# Patient Record
Sex: FEMALE | Race: BLACK OR AFRICAN AMERICAN | NOT HISPANIC OR LATINO | Employment: FULL TIME | URBAN - METROPOLITAN AREA
[De-identification: names, ages, dates, MRNs, and addresses within clinical notes are randomized per-mention and may not be internally consistent; named-entity substitution may affect disease eponyms.]

---

## 2021-03-03 ENCOUNTER — NURSE TRIAGE (OUTPATIENT)
Dept: ADMINISTRATIVE | Facility: CLINIC | Age: 36
End: 2021-03-03

## 2021-09-23 ENCOUNTER — OFFICE VISIT (OUTPATIENT)
Dept: OBSTETRICS AND GYNECOLOGY | Facility: CLINIC | Age: 36
End: 2021-09-23
Payer: COMMERCIAL

## 2021-09-23 ENCOUNTER — LAB VISIT (OUTPATIENT)
Dept: LAB | Facility: OTHER | Age: 36
End: 2021-09-23
Attending: OBSTETRICS & GYNECOLOGY
Payer: COMMERCIAL

## 2021-09-23 VITALS
HEIGHT: 67 IN | WEIGHT: 148.38 LBS | BODY MASS INDEX: 23.29 KG/M2 | SYSTOLIC BLOOD PRESSURE: 108 MMHG | DIASTOLIC BLOOD PRESSURE: 60 MMHG

## 2021-09-23 DIAGNOSIS — N91.2 AMENORRHEA: ICD-10-CM

## 2021-09-23 DIAGNOSIS — N91.2 AMENORRHEA: Primary | ICD-10-CM

## 2021-09-23 DIAGNOSIS — Z32.00 POSSIBLE PREGNANCY: ICD-10-CM

## 2021-09-23 LAB
ABO + RH BLD: NORMAL
B-HCG UR QL: POSITIVE
BASOPHILS # BLD AUTO: 0.04 K/UL (ref 0–0.2)
BASOPHILS NFR BLD: 0.7 % (ref 0–1.9)
BLD GP AB SCN CELLS X3 SERPL QL: NORMAL
CTP QC/QA: YES
DIFFERENTIAL METHOD: ABNORMAL
EOSINOPHIL # BLD AUTO: 0.2 K/UL (ref 0–0.5)
EOSINOPHIL NFR BLD: 3.1 % (ref 0–8)
ERYTHROCYTE [DISTWIDTH] IN BLOOD BY AUTOMATED COUNT: 13.4 % (ref 11.5–14.5)
HCT VFR BLD AUTO: 36.4 % (ref 37–48.5)
HGB BLD-MCNC: 11.8 G/DL (ref 12–16)
IMM GRANULOCYTES # BLD AUTO: 0.01 K/UL (ref 0–0.04)
IMM GRANULOCYTES NFR BLD AUTO: 0.2 % (ref 0–0.5)
LYMPHOCYTES # BLD AUTO: 1.7 K/UL (ref 1–4.8)
LYMPHOCYTES NFR BLD: 29.3 % (ref 18–48)
MCH RBC QN AUTO: 26.6 PG (ref 27–31)
MCHC RBC AUTO-ENTMCNC: 32.4 G/DL (ref 32–36)
MCV RBC AUTO: 82 FL (ref 82–98)
MONOCYTES # BLD AUTO: 0.5 K/UL (ref 0.3–1)
MONOCYTES NFR BLD: 9.4 % (ref 4–15)
NEUTROPHILS # BLD AUTO: 3.3 K/UL (ref 1.8–7.7)
NEUTROPHILS NFR BLD: 57.3 % (ref 38–73)
NRBC BLD-RTO: 0 /100 WBC
PLATELET # BLD AUTO: 324 K/UL (ref 150–450)
PMV BLD AUTO: 9.6 FL (ref 9.2–12.9)
RBC # BLD AUTO: 4.44 M/UL (ref 4–5.4)
WBC # BLD AUTO: 5.76 K/UL (ref 3.9–12.7)

## 2021-09-23 PROCEDURE — 87086 URINE CULTURE/COLONY COUNT: CPT | Performed by: OBSTETRICS & GYNECOLOGY

## 2021-09-23 PROCEDURE — 86762 RUBELLA ANTIBODY: CPT | Performed by: OBSTETRICS & GYNECOLOGY

## 2021-09-23 PROCEDURE — 86900 BLOOD TYPING SEROLOGIC ABO: CPT | Performed by: OBSTETRICS & GYNECOLOGY

## 2021-09-23 PROCEDURE — 87389 HIV-1 AG W/HIV-1&-2 AB AG IA: CPT | Performed by: OBSTETRICS & GYNECOLOGY

## 2021-09-23 PROCEDURE — 87340 HEPATITIS B SURFACE AG IA: CPT | Performed by: OBSTETRICS & GYNECOLOGY

## 2021-09-23 PROCEDURE — 36415 COLL VENOUS BLD VENIPUNCTURE: CPT | Performed by: OBSTETRICS & GYNECOLOGY

## 2021-09-23 PROCEDURE — 86592 SYPHILIS TEST NON-TREP QUAL: CPT | Performed by: OBSTETRICS & GYNECOLOGY

## 2021-09-23 PROCEDURE — 86803 HEPATITIS C AB TEST: CPT | Performed by: OBSTETRICS & GYNECOLOGY

## 2021-09-23 PROCEDURE — 87591 N.GONORRHOEAE DNA AMP PROB: CPT | Performed by: OBSTETRICS & GYNECOLOGY

## 2021-09-23 PROCEDURE — 87491 CHLMYD TRACH DNA AMP PROBE: CPT | Mod: 59 | Performed by: OBSTETRICS & GYNECOLOGY

## 2021-09-23 PROCEDURE — 81025 URINE PREGNANCY TEST: CPT | Mod: PBBFAC,PN | Performed by: OBSTETRICS & GYNECOLOGY

## 2021-09-23 PROCEDURE — 99999 PR PBB SHADOW E&M-NEW PATIENT-LVL III: CPT | Mod: PBBFAC,,, | Performed by: OBSTETRICS & GYNECOLOGY

## 2021-09-23 PROCEDURE — 86787 VARICELLA-ZOSTER ANTIBODY: CPT | Performed by: OBSTETRICS & GYNECOLOGY

## 2021-09-23 PROCEDURE — 99204 OFFICE O/P NEW MOD 45 MIN: CPT | Mod: S$GLB,,, | Performed by: OBSTETRICS & GYNECOLOGY

## 2021-09-23 PROCEDURE — 85025 COMPLETE CBC W/AUTO DIFF WBC: CPT | Performed by: OBSTETRICS & GYNECOLOGY

## 2021-09-23 PROCEDURE — 99203 OFFICE O/P NEW LOW 30 MIN: CPT | Mod: PBBFAC,PN | Performed by: OBSTETRICS & GYNECOLOGY

## 2021-09-23 PROCEDURE — 83020 HEMOGLOBIN ELECTROPHORESIS: CPT | Performed by: OBSTETRICS & GYNECOLOGY

## 2021-09-23 PROCEDURE — 88142 CYTOPATH C/V THIN LAYER: CPT | Performed by: OBSTETRICS & GYNECOLOGY

## 2021-09-23 PROCEDURE — 87624 HPV HI-RISK TYP POOLED RSLT: CPT | Performed by: OBSTETRICS & GYNECOLOGY

## 2021-09-23 PROCEDURE — 99204 PR OFFICE/OUTPT VISIT, NEW, LEVL IV, 45-59 MIN: ICD-10-PCS | Mod: S$GLB,,, | Performed by: OBSTETRICS & GYNECOLOGY

## 2021-09-23 PROCEDURE — 87481 CANDIDA DNA AMP PROBE: CPT | Mod: 59 | Performed by: OBSTETRICS & GYNECOLOGY

## 2021-09-23 PROCEDURE — 99999 PR PBB SHADOW E&M-NEW PATIENT-LVL III: ICD-10-PCS | Mod: PBBFAC,,, | Performed by: OBSTETRICS & GYNECOLOGY

## 2021-09-23 RX ORDER — SWAB
1 SWAB, NON-MEDICATED MISCELLANEOUS DAILY
Qty: 30 TABLET | Refills: 9 | Status: SHIPPED | OUTPATIENT
Start: 2021-09-23 | End: 2021-10-24 | Stop reason: SDUPTHER

## 2021-09-24 LAB
C TRACH DNA SPEC QL NAA+PROBE: NOT DETECTED
HBV SURFACE AG SERPL QL IA: NEGATIVE
HCV AB SERPL QL IA: NEGATIVE
HIV 1+2 AB+HIV1 P24 AG SERPL QL IA: NEGATIVE
N GONORRHOEA DNA SPEC QL NAA+PROBE: NOT DETECTED
RPR SER QL: NORMAL
RUBV IGG SER-ACNC: 63.3 IU/ML
RUBV IGG SER-IMP: REACTIVE

## 2021-09-25 LAB
BACTERIA UR CULT: NO GROWTH
VARICELLA INTERPRETATION: POSITIVE
VARICELLA ZOSTER IGG: 2.45 ISR (ref 0–0.9)

## 2021-09-27 LAB
BACTERIAL VAGINOSIS DNA: POSITIVE
CANDIDA GLABRATA DNA: NEGATIVE
CANDIDA KRUSEI DNA: NEGATIVE
CANDIDA RRNA VAG QL PROBE: POSITIVE
T VAGINALIS RRNA GENITAL QL PROBE: POSITIVE

## 2021-09-28 ENCOUNTER — PATIENT MESSAGE (OUTPATIENT)
Dept: OBSTETRICS AND GYNECOLOGY | Facility: CLINIC | Age: 36
End: 2021-09-28

## 2021-09-28 DIAGNOSIS — A59.9 TRICHOMONAS INFECTION: ICD-10-CM

## 2021-09-28 DIAGNOSIS — B37.31 VAGINAL CANDIDIASIS: ICD-10-CM

## 2021-09-28 DIAGNOSIS — B96.89 BV (BACTERIAL VAGINOSIS): Primary | ICD-10-CM

## 2021-09-28 DIAGNOSIS — N76.0 BV (BACTERIAL VAGINOSIS): Primary | ICD-10-CM

## 2021-09-28 RX ORDER — METRONIDAZOLE 500 MG/1
500 TABLET ORAL EVERY 12 HOURS
Qty: 14 TABLET | Refills: 0 | Status: SHIPPED | OUTPATIENT
Start: 2021-09-28 | End: 2021-10-05

## 2021-09-28 RX ORDER — TERCONAZOLE 4 MG/G
1 CREAM VAGINAL NIGHTLY
Qty: 1 TUBE | Refills: 0 | Status: SHIPPED | OUTPATIENT
Start: 2021-09-28 | End: 2021-10-05

## 2021-09-29 LAB
FINAL PATHOLOGIC DIAGNOSIS: NORMAL
Lab: NORMAL

## 2021-09-30 LAB
HPV HR 12 DNA SPEC QL NAA+PROBE: POSITIVE
HPV16 AG SPEC QL: NEGATIVE
HPV18 DNA SPEC QL NAA+PROBE: NEGATIVE

## 2021-10-01 PROBLEM — B97.7 HPV (HUMAN PAPILLOMA VIRUS) INFECTION: Status: ACTIVE | Noted: 2021-10-01

## 2021-10-03 ENCOUNTER — PATIENT MESSAGE (OUTPATIENT)
Dept: OBSTETRICS AND GYNECOLOGY | Facility: CLINIC | Age: 36
End: 2021-10-03

## 2021-10-04 LAB
HGB A2 MFR BLD HPLC: 2.2 % (ref 2.2–3.2)
HGB FRACT BLD ELPH-IMP: NORMAL
HGB FRACT BLD ELPH-IMP: NORMAL

## 2021-10-21 ENCOUNTER — INITIAL PRENATAL (OUTPATIENT)
Dept: OBSTETRICS AND GYNECOLOGY | Facility: CLINIC | Age: 36
End: 2021-10-21
Payer: COMMERCIAL

## 2021-10-21 ENCOUNTER — PROCEDURE VISIT (OUTPATIENT)
Dept: OBSTETRICS AND GYNECOLOGY | Facility: CLINIC | Age: 36
End: 2021-10-21
Payer: COMMERCIAL

## 2021-10-21 VITALS
WEIGHT: 143.06 LBS | SYSTOLIC BLOOD PRESSURE: 102 MMHG | DIASTOLIC BLOOD PRESSURE: 64 MMHG | BODY MASS INDEX: 22.19 KG/M2

## 2021-10-21 DIAGNOSIS — N91.2 AMENORRHEA: ICD-10-CM

## 2021-10-21 DIAGNOSIS — O09.519 ADVANCED MATERNAL AGE, PRIMIGRAVIDA, ANTEPARTUM: Primary | ICD-10-CM

## 2021-10-21 PROCEDURE — 0502F SUBSEQUENT PRENATAL CARE: CPT | Mod: CPTII,S$GLB,, | Performed by: OBSTETRICS & GYNECOLOGY

## 2021-10-21 PROCEDURE — 0502F PR SUBSEQUENT PRENATAL CARE: ICD-10-PCS | Mod: CPTII,S$GLB,, | Performed by: OBSTETRICS & GYNECOLOGY

## 2021-10-21 PROCEDURE — 99212 OFFICE O/P EST SF 10 MIN: CPT | Mod: PBBFAC,25,PN | Performed by: OBSTETRICS & GYNECOLOGY

## 2021-10-21 PROCEDURE — 76801 OB US < 14 WKS SINGLE FETUS: CPT | Mod: PBBFAC | Performed by: OBSTETRICS & GYNECOLOGY

## 2021-10-21 PROCEDURE — 99999 PR PBB SHADOW E&M-EST. PATIENT-LVL II: CPT | Mod: PBBFAC,,, | Performed by: OBSTETRICS & GYNECOLOGY

## 2021-10-21 PROCEDURE — 99999 PR PBB SHADOW E&M-EST. PATIENT-LVL II: ICD-10-PCS | Mod: PBBFAC,,, | Performed by: OBSTETRICS & GYNECOLOGY

## 2021-10-21 RX ORDER — PNV NO.95/FERROUS FUM/FOLIC AC 28MG-0.8MG
1 TABLET ORAL DAILY
COMMUNITY
Start: 2021-09-23 | End: 2022-01-21

## 2021-10-29 ENCOUNTER — PATIENT MESSAGE (OUTPATIENT)
Dept: ADMINISTRATIVE | Facility: OTHER | Age: 36
End: 2021-10-29
Payer: COMMERCIAL

## 2021-11-05 ENCOUNTER — PATIENT MESSAGE (OUTPATIENT)
Dept: ADMINISTRATIVE | Facility: OTHER | Age: 36
End: 2021-11-05
Payer: COMMERCIAL

## 2021-11-17 ENCOUNTER — ROUTINE PRENATAL (OUTPATIENT)
Dept: OBSTETRICS AND GYNECOLOGY | Facility: CLINIC | Age: 36
End: 2021-11-17
Payer: COMMERCIAL

## 2021-11-17 VITALS — SYSTOLIC BLOOD PRESSURE: 114 MMHG | BODY MASS INDEX: 22.02 KG/M2 | DIASTOLIC BLOOD PRESSURE: 64 MMHG | WEIGHT: 142 LBS

## 2021-11-17 DIAGNOSIS — Z34.02 ENCOUNTER FOR SUPERVISION OF NORMAL FIRST PREGNANCY IN SECOND TRIMESTER: Primary | ICD-10-CM

## 2021-11-17 PROCEDURE — 99999 PR PBB SHADOW E&M-EST. PATIENT-LVL II: CPT | Mod: PBBFAC,,, | Performed by: ADVANCED PRACTICE MIDWIFE

## 2021-11-17 PROCEDURE — 0502F PR SUBSEQUENT PRENATAL CARE: ICD-10-PCS | Mod: CPTII,S$GLB,, | Performed by: ADVANCED PRACTICE MIDWIFE

## 2021-11-17 PROCEDURE — 99999 PR PBB SHADOW E&M-EST. PATIENT-LVL II: ICD-10-PCS | Mod: PBBFAC,,, | Performed by: ADVANCED PRACTICE MIDWIFE

## 2021-11-17 PROCEDURE — 0502F SUBSEQUENT PRENATAL CARE: CPT | Mod: CPTII,S$GLB,, | Performed by: ADVANCED PRACTICE MIDWIFE

## 2021-12-15 ENCOUNTER — ROUTINE PRENATAL (OUTPATIENT)
Dept: OBSTETRICS AND GYNECOLOGY | Facility: CLINIC | Age: 36
End: 2021-12-15
Payer: COMMERCIAL

## 2021-12-15 ENCOUNTER — PATIENT MESSAGE (OUTPATIENT)
Dept: MATERNAL FETAL MEDICINE | Facility: CLINIC | Age: 36
End: 2021-12-15
Payer: COMMERCIAL

## 2021-12-15 VITALS
DIASTOLIC BLOOD PRESSURE: 68 MMHG | WEIGHT: 142.44 LBS | SYSTOLIC BLOOD PRESSURE: 106 MMHG | BODY MASS INDEX: 22.09 KG/M2

## 2021-12-15 DIAGNOSIS — N91.2 AMENORRHEA: Primary | ICD-10-CM

## 2021-12-15 DIAGNOSIS — Z34.02 ENCOUNTER FOR SUPERVISION OF NORMAL FIRST PREGNANCY IN SECOND TRIMESTER: ICD-10-CM

## 2021-12-15 PROCEDURE — 0502F PR SUBSEQUENT PRENATAL CARE: ICD-10-PCS | Mod: CPTII,S$GLB,, | Performed by: ADVANCED PRACTICE MIDWIFE

## 2021-12-15 PROCEDURE — 99999 PR PBB SHADOW E&M-EST. PATIENT-LVL II: ICD-10-PCS | Mod: PBBFAC,,, | Performed by: ADVANCED PRACTICE MIDWIFE

## 2021-12-15 PROCEDURE — 0502F SUBSEQUENT PRENATAL CARE: CPT | Mod: CPTII,S$GLB,, | Performed by: ADVANCED PRACTICE MIDWIFE

## 2021-12-15 PROCEDURE — 99999 PR PBB SHADOW E&M-EST. PATIENT-LVL II: CPT | Mod: PBBFAC,,, | Performed by: ADVANCED PRACTICE MIDWIFE

## 2021-12-17 ENCOUNTER — PROCEDURE VISIT (OUTPATIENT)
Dept: MATERNAL FETAL MEDICINE | Facility: CLINIC | Age: 36
End: 2021-12-17
Payer: COMMERCIAL

## 2021-12-17 DIAGNOSIS — Z34.02 ENCOUNTER FOR SUPERVISION OF NORMAL FIRST PREGNANCY IN SECOND TRIMESTER: ICD-10-CM

## 2021-12-17 PROCEDURE — 76811 OB US DETAILED SNGL FETUS: CPT | Mod: S$GLB,,, | Performed by: OBSTETRICS & GYNECOLOGY

## 2021-12-17 PROCEDURE — 76811 US MFM PROCEDURE (VIEWPOINT): ICD-10-PCS | Mod: S$GLB,,, | Performed by: OBSTETRICS & GYNECOLOGY

## 2022-01-12 DIAGNOSIS — Z36.2 ENCOUNTER FOR FOLLOW-UP ULTRASOUND OF FETAL ANATOMY: Primary | ICD-10-CM

## 2022-01-21 ENCOUNTER — ROUTINE PRENATAL (OUTPATIENT)
Dept: OBSTETRICS AND GYNECOLOGY | Facility: CLINIC | Age: 37
End: 2022-01-21
Payer: COMMERCIAL

## 2022-01-21 VITALS
WEIGHT: 147.06 LBS | SYSTOLIC BLOOD PRESSURE: 100 MMHG | BODY MASS INDEX: 22.81 KG/M2 | DIASTOLIC BLOOD PRESSURE: 62 MMHG

## 2022-01-21 DIAGNOSIS — Z3A.24 24 WEEKS GESTATION OF PREGNANCY: Primary | ICD-10-CM

## 2022-01-21 DIAGNOSIS — Z34.02 ENCOUNTER FOR SUPERVISION OF NORMAL FIRST PREGNANCY IN SECOND TRIMESTER: ICD-10-CM

## 2022-01-21 PROCEDURE — 99999 PR PBB SHADOW E&M-EST. PATIENT-LVL III: ICD-10-PCS | Mod: PBBFAC,,, | Performed by: ADVANCED PRACTICE MIDWIFE

## 2022-01-21 PROCEDURE — 0502F PR SUBSEQUENT PRENATAL CARE: ICD-10-PCS | Mod: CPTII,S$GLB,, | Performed by: ADVANCED PRACTICE MIDWIFE

## 2022-01-21 PROCEDURE — 99999 PR PBB SHADOW E&M-EST. PATIENT-LVL III: CPT | Mod: PBBFAC,,, | Performed by: ADVANCED PRACTICE MIDWIFE

## 2022-01-21 PROCEDURE — 0502F SUBSEQUENT PRENATAL CARE: CPT | Mod: CPTII,S$GLB,, | Performed by: ADVANCED PRACTICE MIDWIFE

## 2022-01-25 ENCOUNTER — PROCEDURE VISIT (OUTPATIENT)
Dept: OBSTETRICS AND GYNECOLOGY | Facility: CLINIC | Age: 37
End: 2022-01-25
Payer: COMMERCIAL

## 2022-01-25 ENCOUNTER — PATIENT MESSAGE (OUTPATIENT)
Dept: MATERNAL FETAL MEDICINE | Facility: CLINIC | Age: 37
End: 2022-01-25
Payer: COMMERCIAL

## 2022-01-25 DIAGNOSIS — Z3A.24 24 WEEKS GESTATION OF PREGNANCY: ICD-10-CM

## 2022-01-25 LAB
BASOPHILS # BLD AUTO: 0.05 K/UL (ref 0–0.2)
BASOPHILS NFR BLD: 0.6 % (ref 0–1.9)
DIFFERENTIAL METHOD: ABNORMAL
EOSINOPHIL # BLD AUTO: 0.1 K/UL (ref 0–0.5)
EOSINOPHIL NFR BLD: 1 % (ref 0–8)
ERYTHROCYTE [DISTWIDTH] IN BLOOD BY AUTOMATED COUNT: 14.3 % (ref 11.5–14.5)
GLUCOSE SERPL-MCNC: 98 MG/DL (ref 70–140)
HCT VFR BLD AUTO: 31.3 % (ref 37–48.5)
HGB BLD-MCNC: 10.1 G/DL (ref 12–16)
IMM GRANULOCYTES # BLD AUTO: 0.06 K/UL (ref 0–0.04)
IMM GRANULOCYTES NFR BLD AUTO: 0.7 % (ref 0–0.5)
LYMPHOCYTES # BLD AUTO: 1.3 K/UL (ref 1–4.8)
LYMPHOCYTES NFR BLD: 15.9 % (ref 18–48)
MCH RBC QN AUTO: 28.9 PG (ref 27–31)
MCHC RBC AUTO-ENTMCNC: 32.3 G/DL (ref 32–36)
MCV RBC AUTO: 89 FL (ref 82–98)
MONOCYTES # BLD AUTO: 0.6 K/UL (ref 0.3–1)
MONOCYTES NFR BLD: 7.9 % (ref 4–15)
NEUTROPHILS # BLD AUTO: 6 K/UL (ref 1.8–7.7)
NEUTROPHILS NFR BLD: 73.9 % (ref 38–73)
NRBC BLD-RTO: 0 /100 WBC
PLATELET # BLD AUTO: 277 K/UL (ref 150–450)
PMV BLD AUTO: 11 FL (ref 9.2–12.9)
RBC # BLD AUTO: 3.5 M/UL (ref 4–5.4)
WBC # BLD AUTO: 8.1 K/UL (ref 3.9–12.7)

## 2022-01-25 PROCEDURE — 82950 GLUCOSE TEST: CPT | Performed by: ADVANCED PRACTICE MIDWIFE

## 2022-01-25 PROCEDURE — 85025 COMPLETE CBC W/AUTO DIFF WBC: CPT | Performed by: ADVANCED PRACTICE MIDWIFE

## 2022-01-26 ENCOUNTER — PROCEDURE VISIT (OUTPATIENT)
Dept: MATERNAL FETAL MEDICINE | Facility: CLINIC | Age: 37
End: 2022-01-26
Payer: COMMERCIAL

## 2022-01-26 DIAGNOSIS — Z36.2 ENCOUNTER FOR FOLLOW-UP ULTRASOUND OF FETAL ANATOMY: ICD-10-CM

## 2022-01-26 DIAGNOSIS — O09.523 AMA (ADVANCED MATERNAL AGE) MULTIGRAVIDA 35+, THIRD TRIMESTER: ICD-10-CM

## 2022-01-26 DIAGNOSIS — Z36.89 ENCOUNTER FOR ULTRASOUND TO ASSESS FETAL GROWTH: Primary | ICD-10-CM

## 2022-01-26 PROCEDURE — 76816 US MFM PROCEDURE (VIEWPOINT): ICD-10-PCS | Mod: S$GLB,,, | Performed by: OBSTETRICS & GYNECOLOGY

## 2022-01-26 PROCEDURE — 76816 OB US FOLLOW-UP PER FETUS: CPT | Mod: S$GLB,,, | Performed by: OBSTETRICS & GYNECOLOGY

## 2022-01-31 NOTE — PROGRESS NOTES
Reason for visit: Routine Prenatal Visit      HPI:   36 y.o., at 25w5d by Estimated Date of Delivery: 22    In with no c/o    - Contractions: denies  - Bleeding: denies  - Loss of fluid: denies  - Fetal movement: reports FM  - Nausea: no  - Vomiting: no  - Headache: no      Reviewed:    Past medical, surgical, social, family, and obstetric history: Reviewed and updated in EMR.  Medications: Reviewed and updated in EMR.  Allergies: Patient has no known allergies.    Pregnancy dating, labs, ultrasound reports, prenatal testing, and problem list: Reviewed and updated in EMR.  Outside records: na  Independent interpretation of tests: na  Discussion with another healthcare professional: na      Vitals: /62   Wt 66.7 kg (147 lb 0.8 oz)   LMP 2021   BMI 22.81 kg/m²     Physical exam:  GENERAL: No acute distress  ABD: Gravid      Assessment and Plan:    24 weeks gestation of pregnancy  -     CBC Auto Differential; Future; Expected date: 2022  -     OB Glucose Screen; Future; Expected date: 2022    Encounter for supervision of normal first pregnancy in second trimester          Instructions given for DM screen next visit   labor precautions given  Follow-up: 4 weeks      I spent a total of 20 minutes on the day of the visit. This includes face to face time and non-face to face time preparing to see the patient (eg, review of tests), Obtaining and/or reviewing separately obtained history, Documenting clinical information in the electronic or other health record, Independently interpreting results and communicating results to the patient/family/caregiver, or Care coordination.

## 2022-02-09 ENCOUNTER — ROUTINE PRENATAL (OUTPATIENT)
Dept: OBSTETRICS AND GYNECOLOGY | Facility: CLINIC | Age: 37
End: 2022-02-09
Payer: COMMERCIAL

## 2022-02-09 VITALS
BODY MASS INDEX: 23.25 KG/M2 | SYSTOLIC BLOOD PRESSURE: 112 MMHG | DIASTOLIC BLOOD PRESSURE: 62 MMHG | WEIGHT: 149.94 LBS

## 2022-02-09 DIAGNOSIS — O09.529 ANTEPARTUM MULTIGRAVIDA OF ADVANCED MATERNAL AGE: Primary | ICD-10-CM

## 2022-02-09 PROCEDURE — 0502F PR SUBSEQUENT PRENATAL CARE: ICD-10-PCS | Mod: CPTII,S$GLB,, | Performed by: OBSTETRICS & GYNECOLOGY

## 2022-02-09 PROCEDURE — 0502F SUBSEQUENT PRENATAL CARE: CPT | Mod: CPTII,S$GLB,, | Performed by: OBSTETRICS & GYNECOLOGY

## 2022-02-09 PROCEDURE — 99999 PR PBB SHADOW E&M-EST. PATIENT-LVL II: CPT | Mod: PBBFAC,,, | Performed by: OBSTETRICS & GYNECOLOGY

## 2022-02-09 PROCEDURE — 99999 PR PBB SHADOW E&M-EST. PATIENT-LVL II: ICD-10-PCS | Mod: PBBFAC,,, | Performed by: OBSTETRICS & GYNECOLOGY

## 2022-02-09 NOTE — PROGRESS NOTES
Reason for visit: Routine Prenatal Visit      HPI:   36 y.o., at 27w0d by Estimated Date of Delivery: 22    No complaints    - Contractions: N  - Bleeding: N  - Loss of fluid: N  - Fetal movement: Y  - Nausea: N  - Vomiting: N  - Headache: N      Reviewed:    Past medical, surgical, social, family, and obstetric history: Reviewed and updated in EMR.  Medications: Reviewed and updated in EMR.  Allergies: Patient has no known allergies.    Pregnancy dating, labs, ultrasound reports, prenatal testing, and problem list: Reviewed and updated in EMR.  Outside records: NA  Independent interpretation of tests: NA  Discussion with another healthcare professional: NA      Vitals: /62   Wt 68 kg (149 lb 14.6 oz)   LMP 2021   BMI 23.25 kg/m²     Physical exam:  GENERAL: No acute distress  ABD: Gravid  FH: 26  FHT: 150      Assessment and Plan:    Antepartum multigravida of advanced maternal age         Discussed TDAP and Influenza vax- pt declined at this time. Counseled regarding vaccine recommendations during pregnancy. Reports understanding    Pt plans to travel to [place] for 1 week at the end of February and requests letter of approval to fly. Letter provided. Counseled regarding importance of calf raises and walking/ 2 hours to decrease risk of thrombosis.      labor precautions given  Follow-up: 2 weeks    Marianela Montiel  MS3    Seen and examined.  Agree with note.  All questions answered  KARLENE Adan MD

## 2022-02-09 NOTE — LETTER
February 9, 2022      [facility]  [address], [address]  [facility] [address]  Phone: [phone]  Fax: [phone]       Patient: Marianela Contreras   YOB: 1985  Date of Visit: 02/09/2022    To Whom It May Concern:    Deuce Contreras  was at [facility] on 02/09/2022. She has a due date of May 11, 2022. The patient may fly with no restrictions until April 13, 2022. If you have any questions or concerns, or if I can be of further assistance, please do not hesitate to contact me.    Sincerely,    Taty Adan MD, FACOG  OBGYN  [facility] [address]  [place] [address] [phone] (office)  [phone] (fax)

## 2022-02-23 ENCOUNTER — ROUTINE PRENATAL (OUTPATIENT)
Dept: OBSTETRICS AND GYNECOLOGY | Facility: CLINIC | Age: 37
End: 2022-02-23
Payer: COMMERCIAL

## 2022-02-23 VITALS
SYSTOLIC BLOOD PRESSURE: 120 MMHG | DIASTOLIC BLOOD PRESSURE: 72 MMHG | BODY MASS INDEX: 23.25 KG/M2 | WEIGHT: 149.94 LBS

## 2022-02-23 DIAGNOSIS — Z34.83 ENCOUNTER FOR SUPERVISION OF OTHER NORMAL PREGNANCY IN THIRD TRIMESTER: Primary | ICD-10-CM

## 2022-02-23 PROCEDURE — 99999 PR PBB SHADOW E&M-EST. PATIENT-LVL II: CPT | Mod: PBBFAC,,, | Performed by: ADVANCED PRACTICE MIDWIFE

## 2022-02-23 PROCEDURE — 0502F SUBSEQUENT PRENATAL CARE: CPT | Mod: S$GLB,,, | Performed by: ADVANCED PRACTICE MIDWIFE

## 2022-02-23 PROCEDURE — 99999 PR PBB SHADOW E&M-EST. PATIENT-LVL II: ICD-10-PCS | Mod: PBBFAC,,, | Performed by: ADVANCED PRACTICE MIDWIFE

## 2022-02-23 PROCEDURE — 0502F PR SUBSEQUENT PRENATAL CARE: ICD-10-PCS | Mod: S$GLB,,, | Performed by: ADVANCED PRACTICE MIDWIFE

## 2022-02-23 RX ORDER — PNV NO.95/FERROUS FUM/FOLIC AC 28MG-0.8MG
1 TABLET ORAL DAILY
COMMUNITY
Start: 2022-02-15 | End: 2022-02-23

## 2022-02-23 NOTE — PROGRESS NOTES
Reason for visit: Routine Prenatal Visit      HPI:   36 y.o., at 29w0d by Estimated Date of Delivery: 22    Patient is well with no complaints.    - Contractions: denies  - Bleeding: denies  - Loss of fluid: denies  - Fetal movement: yes  - Nausea: denies  - Vomiting: denies  - Headache: denies    -Plans to breastfeed.  -Pt requests waterbath birth.     Reviewed:    Past medical, surgical, social, family, and obstetric history: Reviewed and updated in EMR.  Medications: Reviewed and updated in EMR.  Allergies: Patient has no known allergies.    Pregnancy dating, labs, ultrasound reports, prenatal testing, and problem list: Reviewed and updated in EMR.  Outside records: na  Independent interpretation of tests: na  Discussion with another healthcare professional: na      Vitals: /72   Wt 68 kg (149 lb 14.6 oz)   LMP 2021   BMI 23.25 kg/m²     Physical exam:  GENERAL: No acute distress  ABD: Gravid  FH: 28 cm  FHT: 140 bpm    Assessment and Plan:       Pt requests a waterbath birth. Discussed mode of delivery at length with pt and will refer pt to alternative birth center. Informed pt needs to be seen before 31 weeks for waterbath delivery by midwives.   Discussed TDAP and Influenza vax - pt declined.       labor precautions given  Follow-up: 2 weeks with [facility]    Randa Rene MS3    I spent a total of 20 minutes on the day of the visit. This includes face to face time and non-face to face time preparing to see the patient (eg, review of tests), Obtaining and/or reviewing separately obtained history, Documenting clinical information in the electronic or other health record, Independently interpreting results and communicating results to the patient/family/caregiver, or Care coordination.

## 2022-03-09 ENCOUNTER — ROUTINE PRENATAL (OUTPATIENT)
Dept: OBSTETRICS AND GYNECOLOGY | Facility: CLINIC | Age: 37
End: 2022-03-09
Payer: COMMERCIAL

## 2022-03-09 VITALS
SYSTOLIC BLOOD PRESSURE: 110 MMHG | WEIGHT: 157.19 LBS | BODY MASS INDEX: 24.38 KG/M2 | DIASTOLIC BLOOD PRESSURE: 74 MMHG

## 2022-03-09 DIAGNOSIS — O09.529 ANTEPARTUM MULTIGRAVIDA OF ADVANCED MATERNAL AGE: Primary | ICD-10-CM

## 2022-03-09 PROCEDURE — 99999 PR PBB SHADOW E&M-EST. PATIENT-LVL II: CPT | Mod: PBBFAC,,, | Performed by: OBSTETRICS & GYNECOLOGY

## 2022-03-09 PROCEDURE — 0502F SUBSEQUENT PRENATAL CARE: CPT | Mod: CPTII,S$GLB,, | Performed by: OBSTETRICS & GYNECOLOGY

## 2022-03-09 PROCEDURE — 99999 PR PBB SHADOW E&M-EST. PATIENT-LVL II: ICD-10-PCS | Mod: PBBFAC,,, | Performed by: OBSTETRICS & GYNECOLOGY

## 2022-03-09 PROCEDURE — 0502F PR SUBSEQUENT PRENATAL CARE: ICD-10-PCS | Mod: CPTII,S$GLB,, | Performed by: OBSTETRICS & GYNECOLOGY

## 2022-03-09 NOTE — PROGRESS NOTES
Reason for visit: Routine Prenatal Visit      HPI:   36 y.o., at 31w0d by Estimated Date of Delivery: 22    No complaints today.     - Contractions:  No  - Bleeding:  No  - Loss of fluid:  No  - Fetal movement: Yes  - Nausea:  No  - Vomiting:  No  - Headache:  No      Reviewed:    Past medical, surgical, social, family, and obstetric history: Reviewed and updated in EMR.  Medications: Reviewed and updated in EMR.  Allergies: Patient has no known allergies.    Pregnancy dating, labs, ultrasound reports, prenatal testing, and problem list: Reviewed and updated in EMR.  Outside records: Available records reviewed      Vitals: /74   Wt 71.3 kg (157 lb 3 oz)   LMP 2021   BMI 24.38 kg/m²     Physical exam:  GENERAL: No acute distress  ABD: Gravid      Assessment and Plan:    Antepartum multigravida of advanced maternal age         Desires tub birth. Consultation with ABC scheduled for early next week prior to 32 wga.      labor precautions given  Follow-up: 2 weeks     Lauren Powell MD   PGY-2, OB-GYN      Seen and examined.  Agree with note.  All questions answered  KARLENE Adan MD

## 2022-03-15 ENCOUNTER — PATIENT MESSAGE (OUTPATIENT)
Dept: MATERNAL FETAL MEDICINE | Facility: CLINIC | Age: 37
End: 2022-03-15
Payer: COMMERCIAL

## 2022-03-16 ENCOUNTER — PATIENT MESSAGE (OUTPATIENT)
Dept: ADMINISTRATIVE | Facility: OTHER | Age: 37
End: 2022-03-16
Payer: COMMERCIAL

## 2022-03-16 ENCOUNTER — PROCEDURE VISIT (OUTPATIENT)
Dept: MATERNAL FETAL MEDICINE | Facility: CLINIC | Age: 37
End: 2022-03-16
Payer: COMMERCIAL

## 2022-03-16 ENCOUNTER — INITIAL PRENATAL (OUTPATIENT)
Dept: OBSTETRICS AND GYNECOLOGY | Facility: CLINIC | Age: 37
End: 2022-03-16
Payer: COMMERCIAL

## 2022-03-16 VITALS — SYSTOLIC BLOOD PRESSURE: 120 MMHG | WEIGHT: 155 LBS | BODY MASS INDEX: 24.04 KG/M2 | DIASTOLIC BLOOD PRESSURE: 70 MMHG

## 2022-03-16 DIAGNOSIS — Z34.90 PREGNANCY WITH ONE FETUS, ANTEPARTUM: Primary | ICD-10-CM

## 2022-03-16 DIAGNOSIS — O09.523 AMA (ADVANCED MATERNAL AGE) MULTIGRAVIDA 35+, THIRD TRIMESTER: ICD-10-CM

## 2022-03-16 DIAGNOSIS — Z36.89 ENCOUNTER FOR ULTRASOUND TO ASSESS FETAL GROWTH: ICD-10-CM

## 2022-03-16 DIAGNOSIS — O09.513 PRIMIGRAVIDA OF ADVANCED MATERNAL AGE IN THIRD TRIMESTER: ICD-10-CM

## 2022-03-16 DIAGNOSIS — Z3A.32 32 WEEKS GESTATION OF PREGNANCY: ICD-10-CM

## 2022-03-16 PROCEDURE — 76816 PR  US,PREGNANT UTERUS,F/U,TRANSABD APP: ICD-10-PCS | Mod: S$GLB,,, | Performed by: OBSTETRICS & GYNECOLOGY

## 2022-03-16 PROCEDURE — 0500F INITIAL PRENATAL CARE VISIT: CPT | Mod: CPTII,S$GLB,, | Performed by: ADVANCED PRACTICE MIDWIFE

## 2022-03-16 PROCEDURE — 99999 PR PBB SHADOW E&M-EST. PATIENT-LVL II: CPT | Mod: PBBFAC,,, | Performed by: ADVANCED PRACTICE MIDWIFE

## 2022-03-16 PROCEDURE — 99999 PR PBB SHADOW E&M-EST. PATIENT-LVL II: ICD-10-PCS | Mod: PBBFAC,,, | Performed by: ADVANCED PRACTICE MIDWIFE

## 2022-03-16 PROCEDURE — 0500F PR INITIAL PRENATAL CARE VISIT: ICD-10-PCS | Mod: CPTII,S$GLB,, | Performed by: ADVANCED PRACTICE MIDWIFE

## 2022-03-16 PROCEDURE — 76816 OB US FOLLOW-UP PER FETUS: CPT | Mod: S$GLB,,, | Performed by: OBSTETRICS & GYNECOLOGY

## 2022-03-16 NOTE — PROGRESS NOTES
Chief Complaint   Patient presents with    Initial Prenatal Visit       36 y.o.,  at 32w0d by US    Patient presents today for a transfer initial prenatal visit. She reports she has been receiving regular, routine prenatal care at Dr. Adan.  Patient has not completed a Meet & Greet tour of [facility].  She is here today with alone.  She reports she has not received flu vaccine. Got Covid vaccine x2. Not the booster.    Complaints today: Here alone.  Doing well overall.   Would like water birth, would prefer no epidural.    SOCIAL HISTORY: Denies emotional/mental/physical/sexual violence or abuse. Feels safe at home. Accompanied today by self. First with father of baby, he has 2 others (13 & 6)       Patient reports her prepregnancy weight: 147lbs. TW lbs     Patient reports most recent pap smear:  NILM, HPV +, results: repeat postpartum.    ROS  OBSTETRICS:   Contractions No   Bleeding No   Loss of fluid No   Fetal mvmnt:   yes    GASTRO:   Nausea No   Vomiting No      OB History    Para Term  AB Living   1             SAB IAB Ectopic Multiple Live Births                  # Outcome Date GA Lbr Samm/2nd Weight Sex Delivery Anes PTL Lv   1 Current                Dating reviewed  Allergies and problem list reviewed and updated  Medical and surgical history reviewed    PHYSICAL EXAM  /70   Wt 70.3 kg (154 lb 15.7 oz)   LMP 2021   BMI 24.04 kg/m²     GENERAL: No acute distress  HEENT: Normocephalic, atruamatic  NEURO: Alert and oriented x3  PSYCH: Calm, normal mood and affect  PULMONARY: Non-labored respiration; no tachypnea  ABD: Soft, gravid, nontender    ASSESSMENT AND PLAN    OB Problems (from 10/21/21 to present)     No problems associated with this episode.            Patient does have copy of prenatal records here with her today.   Initial labs and dating u/s reviewed.   Counseled today on proper weight gain based on the [employer]'s recommendations based on her  pre-pregnancy weight. Discussed excessive weight gain allowance, which would risk her out of the ABC (and would plan for delivery on L&D), but not midwifery care. Reviewed potential risks to excessive weight gain.  .BMI (prepregnancy)  -- Discussed IOM recommended weight gain of:   Weight category Pre pre BMI  Recommended TWG   Underweight Less than 18.5 28-40    Normal Weight 18.5-24.9  25-35    Overweight 25-29.9  15-25    Obese   30 and greater  11-20   -- Discussed criteria for delivery at ABC r/t excessive pre-preg weight or excessive weight gain:   Pre-pregnancy BMI over 40 or excess pregnancy weight gain defined as:   Pre-preg BMI < 18.5; Excess weight gain = > 60 pound   Pre-preg BMI 18.5-24.9;  Excess weight gain = > 53 pounds   Pre-preg BMI 25-29.9;  Excess weight gain = > 38 pounds   Pre-preg BMI > 30;  Excess weight gain = > 30 pounds    Review exercise precautions in pregnancy, along with recommendations. She does exercise regularly.   Discussed substances & foods to avoid in pregnancy (i.e. sushi, fish that are high in mercury, deli meat, and unpasteurized cheeses).   Discussed prenatal vitamin options (i.e. stool softener, DHA).    Education regarding CDC recommendations for TDAP in pregnancy, reviewed risks/benefits to this.  Patient was oriented to practice and progression of routine prenatal care.   Reviewed New OB Pregnancy packet & questions answered.    Reviewed aneuploidy screening options and indications, questions answered - patient declined.  Education regarding warning signs.  Going to U/S after visit    Follow up: 4 wks, call or present sooner prn.

## 2022-03-21 ENCOUNTER — PATIENT MESSAGE (OUTPATIENT)
Dept: MATERNAL FETAL MEDICINE | Facility: CLINIC | Age: 37
End: 2022-03-21
Payer: COMMERCIAL

## 2022-03-21 DIAGNOSIS — Z36.89 ENCOUNTER FOR ULTRASOUND TO CHECK FETAL GROWTH: Primary | ICD-10-CM

## 2022-03-29 ENCOUNTER — PATIENT MESSAGE (OUTPATIENT)
Dept: MATERNAL FETAL MEDICINE | Facility: CLINIC | Age: 37
End: 2022-03-29
Payer: COMMERCIAL

## 2022-03-31 ENCOUNTER — PATIENT MESSAGE (OUTPATIENT)
Dept: OBSTETRICS AND GYNECOLOGY | Facility: CLINIC | Age: 37
End: 2022-03-31

## 2022-03-31 ENCOUNTER — ROUTINE PRENATAL (OUTPATIENT)
Dept: OBSTETRICS AND GYNECOLOGY | Facility: CLINIC | Age: 37
End: 2022-03-31
Payer: COMMERCIAL

## 2022-03-31 ENCOUNTER — PROCEDURE VISIT (OUTPATIENT)
Dept: MATERNAL FETAL MEDICINE | Facility: CLINIC | Age: 37
End: 2022-03-31
Payer: COMMERCIAL

## 2022-03-31 VITALS
WEIGHT: 153.56 LBS | DIASTOLIC BLOOD PRESSURE: 70 MMHG | BODY MASS INDEX: 23.82 KG/M2 | SYSTOLIC BLOOD PRESSURE: 118 MMHG

## 2022-03-31 DIAGNOSIS — Z34.90 PREGNANCY, UNSPECIFIED GESTATIONAL AGE: ICD-10-CM

## 2022-03-31 DIAGNOSIS — O99.013 ANEMIA AFFECTING PREGNANCY IN THIRD TRIMESTER: ICD-10-CM

## 2022-03-31 DIAGNOSIS — O09.513 PRIMIGRAVIDA OF ADVANCED MATERNAL AGE IN THIRD TRIMESTER: ICD-10-CM

## 2022-03-31 DIAGNOSIS — Z36.89 ENCOUNTER FOR ULTRASOUND TO CHECK FETAL GROWTH: ICD-10-CM

## 2022-03-31 PROCEDURE — 99999 PR PBB SHADOW E&M-EST. PATIENT-LVL II: CPT | Mod: PBBFAC,,, | Performed by: ADVANCED PRACTICE MIDWIFE

## 2022-03-31 PROCEDURE — 76816 US MFM PROCEDURE (VIEWPOINT): ICD-10-PCS | Mod: S$GLB,,, | Performed by: OBSTETRICS & GYNECOLOGY

## 2022-03-31 PROCEDURE — 76816 OB US FOLLOW-UP PER FETUS: CPT | Mod: S$GLB,,, | Performed by: OBSTETRICS & GYNECOLOGY

## 2022-03-31 PROCEDURE — 99999 PR PBB SHADOW E&M-EST. PATIENT-LVL II: ICD-10-PCS | Mod: PBBFAC,,, | Performed by: ADVANCED PRACTICE MIDWIFE

## 2022-03-31 PROCEDURE — 0502F SUBSEQUENT PRENATAL CARE: CPT | Mod: CPTII,S$GLB,, | Performed by: ADVANCED PRACTICE MIDWIFE

## 2022-03-31 PROCEDURE — 0502F PR SUBSEQUENT PRENATAL CARE: ICD-10-PCS | Mod: CPTII,S$GLB,, | Performed by: ADVANCED PRACTICE MIDWIFE

## 2022-03-31 NOTE — PROGRESS NOTES
Chief Complaint   Patient presents with    Routine Prenatal Visit       36 y.o. female  at 34w1d, by Estimated Date of Delivery: 22    Complaints today: None, overall doing well today. Accompanied by Isaiah.     Reviewed TW lbs     Recent growth scan: EFW 18th%, recommended follow up, she reports this was done today.    ROS  OBSTETRICS:   Contractions No   Bleeding No   Loss of fluid No   Fetal mvmnt YES  GASTRO:   Nausea No   Vomiting No      OB History    Para Term  AB Living   1             SAB IAB Ectopic Multiple Live Births                  # Outcome Date GA Lbr Samm/2nd Weight Sex Delivery Anes PTL Lv   1 Current                Dating reviewed  Allergies and problem list reviewed and updated  Medical and surgical history reviewed  Prenatal labs reviewed and updated    PHYSICAL EXAM  /70   Wt 69.7 kg (153 lb 8.8 oz)   LMP 2021   BMI 23.82 kg/m²     GENERAL: No acute distress  HEENT: Normocephalic, atraumatic  NEURO: Alert and oriented x3  PSYCH: Normal mood and affect  PULMONARY: Non-labored respiration; no tachypnea  ABD: Soft, gravid, nontender.      ASSESSMENT AND PLAN    OB Problems (from 10/21/21 to present)     Pregnancy  AMA        Anemia: Discussed 28wk labs today and reviewed anemic - she reports she was unaware. Discussed dietary modifications, along with starting iron supplement. [employer] Message sent with written instructions as well.    Reviewed upcoming 36wk labs - orders placed.     Reviewed ABC risk assessment with the patient:    Birth Center Risk Assessment: 0- Meets birth center guidelines    0- CNM management in ABC  1- CNM management on L&D  2- Consultation with OB to develop  plan of care  3- Collaborative CNM/OB management with delivery on L&D  4- Permanent referral of care to MD    She understands she is a candidate for the ABC. Tour provided today for her and Isaiah. Reviewed potential risks which could arise, that could change this status and  discussed midwifery care on L&D. Questions answered.    Reviewed warning signs, normal FM,  labor precautions, and how/when to call.  Confirmed pt has after-hours number.    Follow-up: 2 weeks, call or present sooner FABIANO Powell CNM

## 2022-04-13 NOTE — PROGRESS NOTES
Chief Complaint   Patient presents with    Routine Prenatal Visit     Labs       36 y.o. female  at 36w1d, by Estimated Date of Delivery: 22    Doing well today.    Meds: Iron, PNV    Reviewed that she is anemic. She states that she is taking an iron supplement.    Reviewed TW lbs    BMI  -- Discussed IOM recommended weight gain of:   Normal Weight 18.5-24.9  25-35   -- Criteria for delivery at [facility] r/t excessive pre-preg weight or excessive weight gain:   Pre-pregnancy BMI over 40 or excess pregnancy weight gain defined as:   Pre-preg BMI 18.5-24.9;  Excess weight gain = > 53 pounds      ROS  OBSTETRICS:   Contractions No   Bleeding No   Loss of fluid No   Fetal mvmnt Yes  GASTRO:   Nausea No   Vomiting No      OB History    Para Term  AB Living   1             SAB IAB Ectopic Multiple Live Births                  # Outcome Date GA Lbr Samm/2nd Weight Sex Delivery Anes PTL Lv   1 Current                Dating reviewed  Allergies and problem list reviewed and updated  Medical and surgical history reviewed  Prenatal labs reviewed and updated    PHYSICAL EXAM  /62   Wt 70 kg (154 lb 5.2 oz)   LMP 2021   BMI 23.94 kg/m²     GENERAL: No acute distress  HEENT: Normocephalic, atraumatic  NEURO: Alert and oriented x3  PSYCH: Normal mood and affect  PULMONARY: Non-labored respiration; no tachypnea  ABD: Soft, gravid, nontender.      ASSESSMENT AND PLAN    OB Problems (from 10/21/21 to present)     Problem Noted Resolved    Pregnancy 3/31/2022 by Mina Rodriguez CNM No    Overview Signed 3/31/2022  8:48 AM by Mina Rodriguez CNM     Prepregnancy BMI: 22 (ABC max wt: 53lb)  Pap: 2021 + HPV, repeat PP   Dating -  U/S - complete, no detected abnormalities   Aneuploidy screening - declined  Blood type: O POS. Rhogam: not indicated  GDM screen - passed  Vaccines - [ ]Flu [ ]TDAP  GBS  [ ]Consents  Contraception -  Peds -   Circ -   COVID vax-  2 doses!              Primigravida of  advanced maternal age in third trimester 3/31/2022 by Anna Powell CNM No    Overview Signed 3/31/2022 10:24 AM by Anna Powell CNM     [x]32w growth ultrasound           Anemia affecting pregnancy in third trimester 3/31/2022 by Anna Powell CNM No    Overview Signed 3/31/2022 10:28 AM by Anna Powell CNM     3/31/22 - Discussed 28w labs and recommend iron supplement.   [ ]Repeat CBC at 36w               GBS swab collected today.     Verified no history of genital HSV.    Reviewed [facility] recommendation for repeat HIV/RPR today; she agrees to have these and a repeat CBC (to evaluate anemia) some time this week.    Growth US next week due to fundal height < dates    Reviewed that she is AMA-- 34 wk US was WNL    Had BV, yeast, and trichomonas in 2021. She states that she was treated at the time and is asymptomatic now. Patient portal message sent recommending for test of cure to be done at next prenatal visit (forgot to do test of cure during current visit). Added to problem list.    Reviewed warning signs, normal FM,  labor precautions, and how/when to call.      Follow-up: 1 week, call or present sooner PRN

## 2022-04-14 ENCOUNTER — ROUTINE PRENATAL (OUTPATIENT)
Dept: OBSTETRICS AND GYNECOLOGY | Facility: CLINIC | Age: 37
End: 2022-04-14
Payer: COMMERCIAL

## 2022-04-14 VITALS
BODY MASS INDEX: 23.94 KG/M2 | WEIGHT: 154.31 LBS | SYSTOLIC BLOOD PRESSURE: 112 MMHG | DIASTOLIC BLOOD PRESSURE: 62 MMHG

## 2022-04-14 DIAGNOSIS — Z13.9 RISK AND FUNCTIONAL ASSESSMENT: ICD-10-CM

## 2022-04-14 DIAGNOSIS — O26.843 FUNDAL HEIGHT LOW FOR DATES IN THIRD TRIMESTER: Primary | ICD-10-CM

## 2022-04-14 DIAGNOSIS — Z34.90 PREGNANCY, UNSPECIFIED GESTATIONAL AGE: ICD-10-CM

## 2022-04-14 DIAGNOSIS — O09.513 PRIMIGRAVIDA OF ADVANCED MATERNAL AGE IN THIRD TRIMESTER: ICD-10-CM

## 2022-04-14 PROCEDURE — 99999 PR PBB SHADOW E&M-EST. PATIENT-LVL II: ICD-10-PCS | Mod: PBBFAC,,, | Performed by: ADVANCED PRACTICE MIDWIFE

## 2022-04-14 PROCEDURE — 0502F SUBSEQUENT PRENATAL CARE: CPT | Mod: CPTII,S$GLB,, | Performed by: ADVANCED PRACTICE MIDWIFE

## 2022-04-14 PROCEDURE — 0502F PR SUBSEQUENT PRENATAL CARE: ICD-10-PCS | Mod: CPTII,S$GLB,, | Performed by: ADVANCED PRACTICE MIDWIFE

## 2022-04-14 PROCEDURE — 99999 PR PBB SHADOW E&M-EST. PATIENT-LVL II: CPT | Mod: PBBFAC,,, | Performed by: ADVANCED PRACTICE MIDWIFE

## 2022-04-14 PROCEDURE — 87081 CULTURE SCREEN ONLY: CPT | Performed by: ADVANCED PRACTICE MIDWIFE

## 2022-04-14 PROCEDURE — 87147 CULTURE TYPE IMMUNOLOGIC: CPT | Performed by: ADVANCED PRACTICE MIDWIFE

## 2022-04-15 ENCOUNTER — PATIENT MESSAGE (OUTPATIENT)
Dept: OBSTETRICS AND GYNECOLOGY | Facility: CLINIC | Age: 37
End: 2022-04-15
Payer: COMMERCIAL

## 2022-04-15 PROBLEM — Z13.9 RISK AND FUNCTIONAL ASSESSMENT: Status: ACTIVE | Noted: 2022-04-15

## 2022-04-15 PROBLEM — A59.01 TRICHOMONAL VAGINITIS DURING PREGNANCY, ANTEPARTUM: Status: ACTIVE | Noted: 2022-04-15

## 2022-04-15 PROBLEM — O23.599 TRICHOMONAL VAGINITIS DURING PREGNANCY, ANTEPARTUM: Status: ACTIVE | Noted: 2022-04-15

## 2022-04-16 PROBLEM — O99.820 GBS (GROUP B STREPTOCOCCUS CARRIER), +RV CULTURE, CURRENTLY PREGNANT: Status: ACTIVE | Noted: 2022-04-16

## 2022-04-16 LAB — BACTERIA SPEC AEROBE CULT: ABNORMAL

## 2022-04-20 ENCOUNTER — PATIENT MESSAGE (OUTPATIENT)
Dept: MATERNAL FETAL MEDICINE | Facility: CLINIC | Age: 37
End: 2022-04-20
Payer: COMMERCIAL

## 2022-04-21 ENCOUNTER — LAB VISIT (OUTPATIENT)
Dept: LAB | Facility: OTHER | Age: 37
End: 2022-04-21
Attending: ADVANCED PRACTICE MIDWIFE
Payer: COMMERCIAL

## 2022-04-21 ENCOUNTER — PROCEDURE VISIT (OUTPATIENT)
Dept: MATERNAL FETAL MEDICINE | Facility: CLINIC | Age: 37
End: 2022-04-21
Payer: COMMERCIAL

## 2022-04-21 ENCOUNTER — ROUTINE PRENATAL (OUTPATIENT)
Dept: OBSTETRICS AND GYNECOLOGY | Facility: CLINIC | Age: 37
End: 2022-04-21
Payer: COMMERCIAL

## 2022-04-21 VITALS — SYSTOLIC BLOOD PRESSURE: 118 MMHG | WEIGHT: 152 LBS | DIASTOLIC BLOOD PRESSURE: 68 MMHG | BODY MASS INDEX: 23.58 KG/M2

## 2022-04-21 DIAGNOSIS — Z34.90 PREGNANCY, UNSPECIFIED GESTATIONAL AGE: ICD-10-CM

## 2022-04-21 DIAGNOSIS — O26.843 FUNDAL HEIGHT LOW FOR DATES IN THIRD TRIMESTER: ICD-10-CM

## 2022-04-21 DIAGNOSIS — Z34.03 ENCOUNTER FOR SUPERVISION OF NORMAL FIRST PREGNANCY IN THIRD TRIMESTER: Primary | ICD-10-CM

## 2022-04-21 LAB
BASOPHILS # BLD AUTO: 0.03 K/UL (ref 0–0.2)
BASOPHILS NFR BLD: 0.6 % (ref 0–1.9)
DIFFERENTIAL METHOD: ABNORMAL
EOSINOPHIL # BLD AUTO: 0.1 K/UL (ref 0–0.5)
EOSINOPHIL NFR BLD: 1.5 % (ref 0–8)
ERYTHROCYTE [DISTWIDTH] IN BLOOD BY AUTOMATED COUNT: 14.8 % (ref 11.5–14.5)
HCT VFR BLD AUTO: 38.3 % (ref 37–48.5)
HGB BLD-MCNC: 12.4 G/DL (ref 12–16)
IMM GRANULOCYTES # BLD AUTO: 0.1 K/UL (ref 0–0.04)
IMM GRANULOCYTES NFR BLD AUTO: 1.9 % (ref 0–0.5)
LYMPHOCYTES # BLD AUTO: 1.3 K/UL (ref 1–4.8)
LYMPHOCYTES NFR BLD: 24.6 % (ref 18–48)
MCH RBC QN AUTO: 28.8 PG (ref 27–31)
MCHC RBC AUTO-ENTMCNC: 32.4 G/DL (ref 32–36)
MCV RBC AUTO: 89 FL (ref 82–98)
MONOCYTES # BLD AUTO: 0.5 K/UL (ref 0.3–1)
MONOCYTES NFR BLD: 9.4 % (ref 4–15)
NEUTROPHILS # BLD AUTO: 3.2 K/UL (ref 1.8–7.7)
NEUTROPHILS NFR BLD: 62 % (ref 38–73)
NRBC BLD-RTO: 0 /100 WBC
PLATELET # BLD AUTO: 205 K/UL (ref 150–450)
PMV BLD AUTO: 11.5 FL (ref 9.2–12.9)
RBC # BLD AUTO: 4.31 M/UL (ref 4–5.4)
WBC # BLD AUTO: 5.21 K/UL (ref 3.9–12.7)

## 2022-04-21 PROCEDURE — 0502F SUBSEQUENT PRENATAL CARE: CPT | Mod: S$GLB,,, | Performed by: ADVANCED PRACTICE MIDWIFE

## 2022-04-21 PROCEDURE — 99999 PR PBB SHADOW E&M-EST. PATIENT-LVL II: ICD-10-PCS | Mod: PBBFAC,,, | Performed by: ADVANCED PRACTICE MIDWIFE

## 2022-04-21 PROCEDURE — 87481 CANDIDA DNA AMP PROBE: CPT | Mod: 59 | Performed by: ADVANCED PRACTICE MIDWIFE

## 2022-04-21 PROCEDURE — 87389 HIV-1 AG W/HIV-1&-2 AB AG IA: CPT | Performed by: ADVANCED PRACTICE MIDWIFE

## 2022-04-21 PROCEDURE — 36415 COLL VENOUS BLD VENIPUNCTURE: CPT | Performed by: ADVANCED PRACTICE MIDWIFE

## 2022-04-21 PROCEDURE — 87801 DETECT AGNT MULT DNA AMPLI: CPT | Performed by: ADVANCED PRACTICE MIDWIFE

## 2022-04-21 PROCEDURE — 99999 PR PBB SHADOW E&M-EST. PATIENT-LVL II: CPT | Mod: PBBFAC,,, | Performed by: ADVANCED PRACTICE MIDWIFE

## 2022-04-21 PROCEDURE — 76816 US MFM PROCEDURE (VIEWPOINT): ICD-10-PCS | Mod: S$GLB,,, | Performed by: OBSTETRICS & GYNECOLOGY

## 2022-04-21 PROCEDURE — 86592 SYPHILIS TEST NON-TREP QUAL: CPT | Performed by: ADVANCED PRACTICE MIDWIFE

## 2022-04-21 PROCEDURE — 0502F PR SUBSEQUENT PRENATAL CARE: ICD-10-PCS | Mod: S$GLB,,, | Performed by: ADVANCED PRACTICE MIDWIFE

## 2022-04-21 PROCEDURE — 76816 OB US FOLLOW-UP PER FETUS: CPT | Mod: S$GLB,,, | Performed by: OBSTETRICS & GYNECOLOGY

## 2022-04-21 PROCEDURE — 85025 COMPLETE CBC W/AUTO DIFF WBC: CPT | Performed by: ADVANCED PRACTICE MIDWIFE

## 2022-04-21 NOTE — PROGRESS NOTES
Chief Complaint   Patient presents with    Routine Prenatal Visit       36 y.o. female  at 37w1d, by Estimated Date of Delivery: 22    Complaints today: none. Doing well today.  Reviewed TW lbs    ROS  OBSTETRICS:   Contractions: Nothing regular   Bleeding No   Loss of fluid No   Fetal mvmnt yes  GASTRO:   Nausea No   Vomiting No      OB History    Para Term  AB Living   1             SAB IAB Ectopic Multiple Live Births                  # Outcome Date GA Lbr Samm/2nd Weight Sex Delivery Anes PTL Lv   1 Current                Dating reviewed  Allergies and problem list reviewed and updated  Medical and surgical history reviewed  Prenatal labs reviewed and updated    PHYSICAL EXAM  /68   Wt 68.9 kg (152 lb 0.1 oz)   LMP 2021   BMI 23.58 kg/m²     GENERAL: No acute distress  HEENT: Normocephalic, atraumatic  NEURO: Alert and oriented x3  PSYCH: Normal mood and affect  PULMONARY: Non-labored respiration; no tachypnea  ABD: Soft, gravid, nontender.      ASSESSMENT AND PLAN    OB Problems (from 10/21/21 to present)     Problem Noted Resolved    GBS (group B Streptococcus carrier), +RV culture, currently pregnant 2022 by Cindy Real CNM No    Overview Signed 2022  1:50 PM by Cindy Real CNM     ( )Prophylaxis in labor           Birth Center Risk Assessment: 0- Meets birth center guidelines 4/15/2022 by Cindy Real CNM No    Overview Signed 4/15/2022  5:52 PM by Cindy Real CNM     Birth Center Risk Assessment: 0- Meets birth center guidelines    0- CNM management in ABC  1- CNM management on L&D  2- Consultation with OB to develop  plan of care  3- Collaborative CNM/OB management with delivery on L&D  4- Permanent referral of care to MD             Trichomonal vaginitis during pregnancy, antepartum 4/15/2022 by Cindy Real CNM No    Overview Signed 4/15/2022  6:14 PM by Cindy Real CNM     In 2021. Was  treated and is asymptomatic as of April 2022.   ( )Test of cure           Pregnancy 3/31/2022 by Mina Rodriguez CNM No    Overview Addendum 4/15/2022  5:59 PM by Cindy Real CNM     Prepregnancy BMI: 22 (ABC max wt: 53lb)  Pap: 09/2021 NILM with + HPV, repeat PP   Dating - By 11wk US  U/S - complete, no detected abnormalities   Aneuploidy screening - declined  Blood type: O POS. Rhogam: not indicated  GDM screen - passed  Vaccines - [ ]Flu [ ]TDAP  GBS  [ ]Consents  Contraception -  Peds -   Circ -   COVID vax-  2 doses!              Previous Version    Primigravida of advanced maternal age in third trimester 3/31/2022 by Anna Powell CNM No    Overview Addendum 4/15/2022  5:52 PM by Cindy Real CNM     [x]34w growth ultrasound WNL           Previous Version    Anemia affecting pregnancy in third trimester 3/31/2022 by Anna Powell CNM No    Overview Signed 3/31/2022 10:28 AM by Anna Powell CNM     3/31/22 - Discussed 28w labs and recommend iron supplement.   [ ]Repeat CBC at 36w                 Delivery consents  Signed today  Reviewed GBS POS and need for intrapartum abx  Reviewed repeat HIV/RPR done  Affirm collected and sent for maday for trich  Education regarding labor precautions.    Reviewed warning signs, normal FM, and how/when to call.      Follow-up: 1 week, call or present sooner PRN

## 2022-04-22 LAB
BACTERIAL VAGINOSIS DNA: NEGATIVE
CANDIDA GLABRATA DNA: NEGATIVE
CANDIDA KRUSEI DNA: NEGATIVE
CANDIDA RRNA VAG QL PROBE: NEGATIVE
RPR SER QL: NORMAL
T VAGINALIS RRNA GENITAL QL PROBE: POSITIVE

## 2022-04-23 ENCOUNTER — PATIENT MESSAGE (OUTPATIENT)
Dept: OBSTETRICS AND GYNECOLOGY | Facility: CLINIC | Age: 37
End: 2022-04-23
Payer: COMMERCIAL

## 2022-04-23 DIAGNOSIS — A59.01 TRICHOMONAL VAGINITIS DURING PREGNANCY, ANTEPARTUM: Primary | ICD-10-CM

## 2022-04-23 DIAGNOSIS — O23.599 TRICHOMONAL VAGINITIS DURING PREGNANCY, ANTEPARTUM: Primary | ICD-10-CM

## 2022-04-23 RX ORDER — METRONIDAZOLE 500 MG/1
500 TABLET ORAL 2 TIMES DAILY
Qty: 14 TABLET | Refills: 0 | Status: SHIPPED | OUTPATIENT
Start: 2022-04-23 | End: 2022-04-30

## 2022-04-23 NOTE — TELEPHONE ENCOUNTER
Pos trich on 4/21/22  Rx metronidazole 500 mg bid sent, pt counseled via MyChart and advised that partner will need treatment.  Avoid unprotected intercourse until both partners have completed therapy

## 2022-04-26 LAB — HIV 1+2 AB+HIV1 P24 AG SERPL QL IA: NEGATIVE

## 2022-04-28 ENCOUNTER — PATIENT MESSAGE (OUTPATIENT)
Dept: OBSTETRICS AND GYNECOLOGY | Facility: CLINIC | Age: 37
End: 2022-04-28

## 2022-04-28 ENCOUNTER — ROUTINE PRENATAL (OUTPATIENT)
Dept: OBSTETRICS AND GYNECOLOGY | Facility: CLINIC | Age: 37
End: 2022-04-28
Payer: COMMERCIAL

## 2022-04-28 VITALS — SYSTOLIC BLOOD PRESSURE: 119 MMHG | DIASTOLIC BLOOD PRESSURE: 77 MMHG | WEIGHT: 154 LBS | BODY MASS INDEX: 23.89 KG/M2

## 2022-04-28 DIAGNOSIS — Z34.93 PRENATAL CARE IN THIRD TRIMESTER: Primary | ICD-10-CM

## 2022-04-28 PROCEDURE — 99999 PR PBB SHADOW E&M-EST. PATIENT-LVL II: CPT | Mod: PBBFAC,,, | Performed by: ADVANCED PRACTICE MIDWIFE

## 2022-04-28 PROCEDURE — 0502F PR SUBSEQUENT PRENATAL CARE: ICD-10-PCS | Mod: CPTII,S$GLB,, | Performed by: ADVANCED PRACTICE MIDWIFE

## 2022-04-28 PROCEDURE — 99999 PR PBB SHADOW E&M-EST. PATIENT-LVL II: ICD-10-PCS | Mod: PBBFAC,,, | Performed by: ADVANCED PRACTICE MIDWIFE

## 2022-04-28 PROCEDURE — 0502F SUBSEQUENT PRENATAL CARE: CPT | Mod: CPTII,S$GLB,, | Performed by: ADVANCED PRACTICE MIDWIFE

## 2022-04-28 NOTE — PROGRESS NOTES
No chief complaint on file.      36 y.o. female  at 38w1d, by Estimated Date of Delivery: 22    Complaints today: denies. Doing well today.  Reviewed TW lbs    ROS  OBSTETRICS:   Contractions: Nothing regular   Bleeding No   Loss of fluid No   Fetal mvmnt Present   GASTRO:   Nausea No   Vomiting No      OB History    Para Term  AB Living   1             SAB IAB Ectopic Multiple Live Births                  # Outcome Date GA Lbr Samm/2nd Weight Sex Delivery Anes PTL Lv   1 Current                Dating reviewed  Allergies and problem list reviewed and updated  Medical and surgical history reviewed  Prenatal labs reviewed and updated    PHYSICAL EXAM  /77   Wt 69.8 kg (153 lb 15.9 oz)   LMP 2021   BMI 23.89 kg/m²     GENERAL: No acute distress  HEENT: Normocephalic, atraumatic  NEURO: Alert and oriented x3  PSYCH: Normal mood and affect  PULMONARY: Non-labored respiration; no tachypnea  ABD: Soft, gravid, nontender.      ASSESSMENT AND PLAN    OB Problems (from 10/21/21 to present)     Problem Noted Resolved    GBS (group B Streptococcus carrier), +RV culture, currently pregnant 2022 by Cindy Real CNM No    Overview Signed 2022  1:50 PM by Cindy Real CNM     ( )Prophylaxis in labor           Birth Center Risk Assessment: 0- Meets birth center guidelines 4/15/2022 by Cindy Rela CNM No    Overview Signed 4/15/2022  5:52 PM by Cindy Real CNM     Birth Center Risk Assessment: 0- Meets birth center guidelines    0- CNM management in ABC  1- CNM management on L&D  2- Consultation with OB to develop  plan of care  3- Collaborative CNM/OB management with delivery on L&D  4- Permanent referral of care to MD             Trichomonal vaginitis during pregnancy, antepartum 4/15/2022 by Cindy Real CNM No    Overview Signed 4/15/2022  6:14 PM by Cindy Real CNM     In 2021. Was treated and is asymptomatic  as of April 2022.   ( )Test of cure           Pregnancy 3/31/2022 by Mina Rodriguez CNM No    Overview Addendum 4/15/2022  5:59 PM by Cindy Real CNM     Prepregnancy BMI: 22 (ABC max wt: 53lb)  Pap: 09/2021 NILM with + HPV, repeat PP   Dating - By 11wk US  U/S - complete, no detected abnormalities   Aneuploidy screening - declined  Blood type: O POS. Rhogam: not indicated  GDM screen - passed  Vaccines - [ ]Flu [ ]TDAP  GBS  [ ]Consents  Contraception -  Peds -   Circ -   COVID vax-  2 doses!              Previous Version    Primigravida of advanced maternal age in third trimester 3/31/2022 by Anna Powell CNM No    Overview Addendum 4/15/2022  5:52 PM by Cindy Real CNM     [x]34w growth ultrasound WNL           Previous Version    Anemia affecting pregnancy in third trimester 3/31/2022 by Anna Powell CNM No    Overview Signed 3/31/2022 10:28 AM by Anna Powell CNM     3/31/22 - Discussed 28w labs and recommend iron supplement.   [ ]Repeat CBC at 36w                 Delivery consents already signed  Discussed plans for support people during labor - she plans to have hayden.    Reviewed warning signs, normal FM, and how/when to call.      Follow-up: 1 week, call or present sooner PRN

## 2022-04-28 NOTE — LETTER
April 28, 2022  Re: Isaiah Bear Ben  [address] [place] [address]             [facility]  [address] [place] [address]  Phone: [phone]  Fax: [phone] To Whom it May Concern:    The patient above (partner of Mr. Ny) diagnosed with trichomonas infection. He is recommended to have STI testing and treatment for trich. Thank you.        If you have any questions or concerns, please don't hesitate to call.    Sincerely,        Elizabeth Dean CNM

## 2022-05-05 ENCOUNTER — ROUTINE PRENATAL (OUTPATIENT)
Dept: OBSTETRICS AND GYNECOLOGY | Facility: CLINIC | Age: 37
End: 2022-05-05
Payer: COMMERCIAL

## 2022-05-05 VITALS
DIASTOLIC BLOOD PRESSURE: 69 MMHG | BODY MASS INDEX: 23.92 KG/M2 | SYSTOLIC BLOOD PRESSURE: 113 MMHG | WEIGHT: 154.19 LBS

## 2022-05-05 DIAGNOSIS — A59.01 TRICHOMONAL VAGINITIS DURING PREGNANCY, ANTEPARTUM: ICD-10-CM

## 2022-05-05 DIAGNOSIS — Z34.93 PRENATAL CARE IN THIRD TRIMESTER: Primary | ICD-10-CM

## 2022-05-05 DIAGNOSIS — O23.599 TRICHOMONAL VAGINITIS DURING PREGNANCY, ANTEPARTUM: ICD-10-CM

## 2022-05-05 DIAGNOSIS — B95.1 POSITIVE GBS TEST: ICD-10-CM

## 2022-05-05 DIAGNOSIS — Z34.90 PREGNANCY, UNSPECIFIED GESTATIONAL AGE: ICD-10-CM

## 2022-05-05 PROCEDURE — 0502F PR SUBSEQUENT PRENATAL CARE: ICD-10-PCS | Mod: CPTII,S$GLB,, | Performed by: ADVANCED PRACTICE MIDWIFE

## 2022-05-05 PROCEDURE — 87661 TRICHOMONAS VAGINALIS AMPLIF: CPT | Performed by: ADVANCED PRACTICE MIDWIFE

## 2022-05-05 PROCEDURE — 99999 PR PBB SHADOW E&M-EST. PATIENT-LVL II: ICD-10-PCS | Mod: PBBFAC,,, | Performed by: ADVANCED PRACTICE MIDWIFE

## 2022-05-05 PROCEDURE — 99999 PR PBB SHADOW E&M-EST. PATIENT-LVL II: CPT | Mod: PBBFAC,,, | Performed by: ADVANCED PRACTICE MIDWIFE

## 2022-05-05 PROCEDURE — 0502F SUBSEQUENT PRENATAL CARE: CPT | Mod: CPTII,S$GLB,, | Performed by: ADVANCED PRACTICE MIDWIFE

## 2022-05-05 RX ORDER — PNV NO.95/FERROUS FUM/FOLIC AC 28MG-0.8MG
1 TABLET ORAL DAILY
Status: ON HOLD | COMMUNITY
Start: 2022-04-19 | End: 2022-05-23 | Stop reason: HOSPADM

## 2022-05-09 LAB
T VAGINALIS RRNA SPEC QL NAA+PROBE: NOT DETECTED
TRICHOMONAS VAGINALIS RNA, QUAL, SOURCE: NORMAL

## 2022-05-11 PROBLEM — B95.1 POSITIVE GBS TEST: Status: RESOLVED | Noted: 2022-05-05 | Resolved: 2022-05-11

## 2022-05-11 NOTE — PROGRESS NOTES
Chief Complaint   Patient presents with    Routine Prenatal Visit     36 y.o. female  at 40w0d, by Estimated Date of Delivery: 22    Doing well today.    Reviewed TW lbs    BMI  -- Discussed IOM recommended weight gain of:   Normal Weight 18.5-24.9  25-35    -- Discussed criteria for delivery at [facility] r/t excessive pre-preg weight or excessive weight gain:   Pre-pregnancy BMI over 40 or excess pregnancy weight gain defined as:   Pre-preg BMI 18.5-24.9;  Excess weight gain = > 53 pounds      ROS  OBSTETRICS:   Contractions: Nothing regular   Bleeding No   Loss of fluid No    Fetal mvmnt Yes  GASTRO:   Nausea No   Vomiting No      OB History    Para Term  AB Living   1             SAB IAB Ectopic Multiple Live Births                  # Outcome Date GA Lbr Samm/2nd Weight Sex Delivery Anes PTL Lv   1 Current                Dating reviewed  Allergies and problem list reviewed and updated  Medical and surgical history reviewed  Prenatal labs reviewed and updated    PHYSICAL EXAM  /71   Wt 69.9 kg (154 lb 1.6 oz)   LMP 2021   BMI 23.90 kg/m²     GENERAL: No acute distress  HEENT: Normocephalic, atraumatic  NEURO: Alert and oriented x3  PSYCH: Normal mood and affect  PULMONARY: Non-labored respiration; no tachypnea  ABD: Soft, gravid, nontender.      ASSESSMENT AND PLAN    OB Problems (from 10/21/21 to present)     Problem Noted Resolved    Positive GBS test 2022 by Elizabeth Dean CNM No    Overview Signed 2022  8:28 AM by Elizabeth Dean CNM     Treat in labor           GBS (group B Streptococcus carrier), +RV culture, currently pregnant 2022 by Cindy Real CNM No    Overview Signed 2022  1:50 PM by Cindy Real CNM     ( )Prophylaxis in labor           Birth Center Risk Assessment: 0- Meets birth center guidelines 4/15/2022 by Cindy Real CNM No    Overview Signed 4/15/2022  5:52 PM by Cindy Real CNM     Birth Center Risk  Assessment: 0- Meets birth center guidelines    0- CNM management in ABC  1- CNM management on L&D  2- Consultation with OB to develop  plan of care  3- Collaborative CNM/OB management with delivery on L&D  4- Permanent referral of care to MD             Trichomonal vaginitis during pregnancy, antepartum 4/15/2022 by Cindy Real CNM No    Overview Signed 4/15/2022  6:14 PM by Cindy Real CNM     In September 2021. Was treated and is asymptomatic as of April 2022.   ( )Test of cure           Pregnancy 3/31/2022 by Mina Rodrgiuez CNM No    Overview Addendum 5/5/2022  8:29 AM by Elizabeth Dean CNM     Prepregnancy BMI: 22 (ABC max wt: 53lb)  Pap: 09/2021 NILM with + HPV, repeat PP   Dating - By 11wk US  U/S - complete, no detected abnormalities   Aneuploidy screening - declined  Blood type: O POS. Rhogam: not indicated  GDM screen - passed  Vaccines - [ ]Flu [declined ]TDAP  GBS-positive  [ ]Consents-  Contraception -  Peds- discussed and information for Dr. DELVIS Mejia given   Circ -   COVID vax-  2 doses!              Previous Version    Primigravida of advanced maternal age in third trimester 3/31/2022 by Anna Powell CNM No    Overview Addendum 4/15/2022  5:52 PM by Cindy Real CNM     [x]34w growth ultrasound WNL           Previous Version    Anemia affecting pregnancy in third trimester 3/31/2022 by Anna Powell CNM No    Overview Signed 3/31/2022 10:28 AM by Anna Powell CNM     3/31/22 - Discussed 28w labs and recommend iron supplement.   [ ]Repeat CBC at 36w               Delivery consents signed.    Discussed postdates management and IOL - patient prefers to await spontaneous labor if possible / desires IOL at 41w6d.    Discussed postdates prenatal testing and that IOL would be recommended immediately if prenatal testing is not reassuring; patient states understanding and agrees to this.  Scheduled NST/LUH starting at 41wks.   Patient desires IOL at 41w6d on 5/24/22.  Order placed.  Epic staff message sent to CNMs re: scheduling of IOL    Reviewed warning signs, normal FM, and how/when to call.      Follow-up: 1 week, call or present sooner PRN

## 2022-05-12 ENCOUNTER — ROUTINE PRENATAL (OUTPATIENT)
Dept: OBSTETRICS AND GYNECOLOGY | Facility: CLINIC | Age: 37
End: 2022-05-12
Payer: COMMERCIAL

## 2022-05-12 VITALS — SYSTOLIC BLOOD PRESSURE: 118 MMHG | BODY MASS INDEX: 23.9 KG/M2 | DIASTOLIC BLOOD PRESSURE: 71 MMHG | WEIGHT: 154.13 LBS

## 2022-05-12 DIAGNOSIS — Z34.90 PREGNANCY, UNSPECIFIED GESTATIONAL AGE: ICD-10-CM

## 2022-05-12 DIAGNOSIS — O23.599 TRICHOMONAL VAGINITIS DURING PREGNANCY, ANTEPARTUM: ICD-10-CM

## 2022-05-12 DIAGNOSIS — O48.0 POST-TERM PREGNANCY, 40-42 WEEKS OF GESTATION: Primary | ICD-10-CM

## 2022-05-12 DIAGNOSIS — Z13.9 RISK AND FUNCTIONAL ASSESSMENT: ICD-10-CM

## 2022-05-12 DIAGNOSIS — A59.01 TRICHOMONAL VAGINITIS DURING PREGNANCY, ANTEPARTUM: ICD-10-CM

## 2022-05-12 DIAGNOSIS — O99.013 ANEMIA AFFECTING PREGNANCY IN THIRD TRIMESTER: ICD-10-CM

## 2022-05-12 PROCEDURE — 0502F SUBSEQUENT PRENATAL CARE: CPT | Mod: CPTII,S$GLB,, | Performed by: ADVANCED PRACTICE MIDWIFE

## 2022-05-12 PROCEDURE — 99999 PR PBB SHADOW E&M-EST. PATIENT-LVL III: CPT | Mod: PBBFAC,,, | Performed by: ADVANCED PRACTICE MIDWIFE

## 2022-05-12 PROCEDURE — 99999 PR PBB SHADOW E&M-EST. PATIENT-LVL III: ICD-10-PCS | Mod: PBBFAC,,, | Performed by: ADVANCED PRACTICE MIDWIFE

## 2022-05-12 PROCEDURE — 0502F PR SUBSEQUENT PRENATAL CARE: ICD-10-PCS | Mod: CPTII,S$GLB,, | Performed by: ADVANCED PRACTICE MIDWIFE

## 2022-05-13 PROBLEM — O99.013 ANEMIA AFFECTING PREGNANCY IN THIRD TRIMESTER: Status: RESOLVED | Noted: 2022-03-31 | Resolved: 2022-05-13

## 2022-05-17 NOTE — PROGRESS NOTES
Chief Complaint   Patient presents with    Routine Prenatal Visit     36 y.o. female  at 40w6d, by Estimated Date of Delivery: 22    Doing well today. Had a prenatal testing visit just prior to this visit today.    Reviewed TW lbs    BMI  -- Discussed IOM recommended weight gain of:   Normal Weight 18.5-24.9  25-35   -- Criteria for delivery at [facility] r/t excessive pre-preg weight or excessive weight gain:   Pre-pregnancy BMI over 40 or excess pregnancy weight gain defined as:   Pre-preg BMI 18.5-24.9;  Excess weight gain = > 53 pounds      ROS  OBSTETRICS:   Contractions: Nothing regular   Bleeding No   Loss of fluid No   Fetal mvmnt Yes  GASTRO:   Nausea No   Vomiting No      OB History    Para Term  AB Living   1             SAB IAB Ectopic Multiple Live Births                  # Outcome Date GA Lbr Samm/2nd Weight Sex Delivery Anes PTL Lv   1 Current                Dating reviewed  Allergies and problem list reviewed and updated  Medical and surgical history reviewed  Prenatal labs reviewed and updated    PHYSICAL EXAM  /68   Wt 70.1 kg (154 lb 6.9 oz)   LMP 2021   BMI 23.96 kg/m²     GENERAL: No acute distress  HEENT: Normocephalic, atraumatic  NEURO: Alert and oriented x3  PSYCH: Normal mood and affect  PULMONARY: Non-labored respiration; no tachypnea  ABD: Soft, gravid, nontender.      ASSESSMENT AND PLAN    OB Problems (from 10/21/21 to present)     Problem Noted Resolved    GBS (group B Streptococcus carrier), +RV culture, currently pregnant 2022 by Cindy Real CNM No    Overview Signed 2022  1:50 PM by Cindy Real CNM     ( )Prophylaxis in labor           Birth Center Risk Assessment: 0- Meets birth center guidelines 4/15/2022 by Cindy Real CNM No    Overview Signed 4/15/2022  5:52 PM by Cindy Real CNM     Birth Center Risk Assessment: 0- Meets birth center guidelines    0- CNM management in ABC  1- CNM management on  L&D  2- Consultation with OB to develop  plan of care  3- Collaborative CNM/OB management with delivery on L&D  4- Permanent referral of care to MD             Trichomonal vaginitis during pregnancy, antepartum 4/15/2022 by Cindy Real CNM No    Overview Addendum 5/13/2022  4:02 PM by Cindy Real CNM     In September 2021. Was treated and is asymptomatic as of April 2022.   Trich RNA negative on 5/5/22           Previous Version    Pregnancy 3/31/2022 by Mina Rodriguez CNM No    Overview Addendum 5/13/2022  4:09 PM by Cindy Real CNM     Prepregnancy BMI: 22 (ABC max wt: 53lb)  Pap: 09/2021 NILM with + HPV, repeat PP   Dating - By 11wk   U/S - complete, no detected abnormalities   Aneuploidy screening - declined  Blood type: O POS. Rhogam: not indicated  GDM screen - passed   Vaccines - [ ]Flu [declined]TDAP  GBS positive  [x]Consents  Contraception -  Peds- discussed and information for Dr. DELVIS Mejia given   Circ -   COVID vax-  2 doses!             Previous Version    Primigravida of advanced maternal age in third trimester 3/31/2022 by Anna Powell CNM No    Overview Addendum 4/15/2022  5:52 PM by Cindy Real CNM     [x]34w growth ultrasound WNL           Previous Version    Positive GBS test 5/5/2022 by Elizabeth Dean CNM 5/11/2022 by Cindy Real CNM    Overview Signed 5/5/2022  8:28 AM by Elizabeth Dean CNM     Treat in labor           Anemia affecting pregnancy in third trimester 3/31/2022 by Anna Powell CNM 5/13/2022 by Cindy Real CNM    Overview Addendum 5/13/2022  4:03 PM by Cindy Real CNM     3/31/22 - Discussed 28w labs and recommend iron supplement.   [x]Repeat CBC at 36w -- RESOLVED           Previous Version        Repeat Affirm swab today for trichomonas test of cure. Marianela agrees to this.    Delivery consents already signed at a previous visit.    Requested SVE. 1.5/80/-2    Discussed postdates management and IOL  - patient prefers to await spontaneous labor if possible / desires IOL at 41w5d.    Discussed postdates prenatal testing and that IOL would be recommended immediately if prenatal testing is not reassuring; patient states understanding and agrees to this.  She had a prenatal testing appointment today. Will have two PNT visits during her 41st week of pregnancy.  She is already scheduled for IOL on 5/23/22 at 0500. She will be 41w5d pregnant at that time.    Reviewed warning signs, normal FM, and how/when to call.      Follow-up: 1 week, call or present sooner PRN

## 2022-05-18 ENCOUNTER — HOSPITAL ENCOUNTER (OUTPATIENT)
Dept: PERINATAL CARE | Facility: OTHER | Age: 37
Discharge: HOME | End: 2022-05-18
Attending: ADVANCED PRACTICE MIDWIFE
Payer: COMMERCIAL

## 2022-05-18 ENCOUNTER — ROUTINE PRENATAL (OUTPATIENT)
Dept: OBSTETRICS AND GYNECOLOGY | Facility: CLINIC | Age: 37
End: 2022-05-18
Payer: COMMERCIAL

## 2022-05-18 VITALS
DIASTOLIC BLOOD PRESSURE: 68 MMHG | SYSTOLIC BLOOD PRESSURE: 112 MMHG | BODY MASS INDEX: 23.96 KG/M2 | WEIGHT: 154.44 LBS

## 2022-05-18 DIAGNOSIS — Z34.90 PREGNANCY, UNSPECIFIED GESTATIONAL AGE: Primary | ICD-10-CM

## 2022-05-18 DIAGNOSIS — O23.599 TRICHOMONAL VAGINITIS DURING PREGNANCY, ANTEPARTUM: ICD-10-CM

## 2022-05-18 DIAGNOSIS — O48.0 POST-TERM PREGNANCY, 40-42 WEEKS OF GESTATION: ICD-10-CM

## 2022-05-18 DIAGNOSIS — A59.01 TRICHOMONAL VAGINITIS DURING PREGNANCY, ANTEPARTUM: ICD-10-CM

## 2022-05-18 PROCEDURE — 59025 PRENATAL TESTING - NST/AFI: ICD-10-PCS | Mod: 26,,, | Performed by: OBSTETRICS & GYNECOLOGY

## 2022-05-18 PROCEDURE — 76815 OB US LIMITED FETUS(S): CPT | Mod: 26,,, | Performed by: OBSTETRICS & GYNECOLOGY

## 2022-05-18 PROCEDURE — 0502F SUBSEQUENT PRENATAL CARE: CPT | Mod: CPTII,S$GLB,, | Performed by: ADVANCED PRACTICE MIDWIFE

## 2022-05-18 PROCEDURE — 76815 OB US LIMITED FETUS(S): CPT

## 2022-05-18 PROCEDURE — 0502F PR SUBSEQUENT PRENATAL CARE: ICD-10-PCS | Mod: CPTII,S$GLB,, | Performed by: ADVANCED PRACTICE MIDWIFE

## 2022-05-18 PROCEDURE — 87481 CANDIDA DNA AMP PROBE: CPT | Mod: 59 | Performed by: ADVANCED PRACTICE MIDWIFE

## 2022-05-18 PROCEDURE — 99999 PR PBB SHADOW E&M-EST. PATIENT-LVL II: CPT | Mod: PBBFAC,,, | Performed by: ADVANCED PRACTICE MIDWIFE

## 2022-05-18 PROCEDURE — 59025 FETAL NON-STRESS TEST: CPT | Mod: 26,,, | Performed by: OBSTETRICS & GYNECOLOGY

## 2022-05-18 PROCEDURE — 76815 PRENATAL TESTING - NST/AFI: ICD-10-PCS | Mod: 26,,, | Performed by: OBSTETRICS & GYNECOLOGY

## 2022-05-18 PROCEDURE — 99999 PR PBB SHADOW E&M-EST. PATIENT-LVL II: ICD-10-PCS | Mod: PBBFAC,,, | Performed by: ADVANCED PRACTICE MIDWIFE

## 2022-05-18 PROCEDURE — 59025 FETAL NON-STRESS TEST: CPT

## 2022-05-20 ENCOUNTER — HOSPITAL ENCOUNTER (INPATIENT)
Facility: OTHER | Age: 37
LOS: 3 days | Discharge: HOME | End: 2022-05-23
Attending: OBSTETRICS & GYNECOLOGY | Admitting: OBSTETRICS & GYNECOLOGY
Payer: COMMERCIAL

## 2022-05-20 ENCOUNTER — ANESTHESIA (OUTPATIENT)
Dept: OBSTETRICS AND GYNECOLOGY | Facility: OTHER | Age: 37
End: 2022-05-20
Payer: COMMERCIAL

## 2022-05-20 ENCOUNTER — HOSPITAL ENCOUNTER (OUTPATIENT)
Dept: PERINATAL CARE | Facility: OTHER | Age: 37
Discharge: HOME | End: 2022-05-20
Attending: ADVANCED PRACTICE MIDWIFE
Payer: COMMERCIAL

## 2022-05-20 ENCOUNTER — ANESTHESIA EVENT (OUTPATIENT)
Dept: OBSTETRICS AND GYNECOLOGY | Facility: OTHER | Age: 37
End: 2022-05-20
Payer: COMMERCIAL

## 2022-05-20 DIAGNOSIS — O99.820 GBS (GROUP B STREPTOCOCCUS CARRIER), +RV CULTURE, CURRENTLY PREGNANT: ICD-10-CM

## 2022-05-20 DIAGNOSIS — Z34.90 ENCOUNTER FOR INDUCTION OF LABOR: ICD-10-CM

## 2022-05-20 DIAGNOSIS — O48.0 POST-TERM PREGNANCY, 40-42 WEEKS OF GESTATION: ICD-10-CM

## 2022-05-20 DIAGNOSIS — O28.8 NON-REACTIVE NST (NON-STRESS TEST): ICD-10-CM

## 2022-05-20 LAB
ABO + RH BLD: NORMAL
BASOPHILS # BLD AUTO: 0.03 K/UL (ref 0–0.2)
BASOPHILS NFR BLD: 0.4 % (ref 0–1.9)
BLD GP AB SCN CELLS X3 SERPL QL: NORMAL
DIFFERENTIAL METHOD: ABNORMAL
EOSINOPHIL # BLD AUTO: 0.1 K/UL (ref 0–0.5)
EOSINOPHIL NFR BLD: 1.5 % (ref 0–8)
ERYTHROCYTE [DISTWIDTH] IN BLOOD BY AUTOMATED COUNT: 14.9 % (ref 11.5–14.5)
HCT VFR BLD AUTO: 39.2 % (ref 37–48.5)
HGB BLD-MCNC: 13 G/DL (ref 12–16)
IMM GRANULOCYTES # BLD AUTO: 0.08 K/UL (ref 0–0.04)
IMM GRANULOCYTES NFR BLD AUTO: 1.1 % (ref 0–0.5)
LYMPHOCYTES # BLD AUTO: 1.4 K/UL (ref 1–4.8)
LYMPHOCYTES NFR BLD: 18.9 % (ref 18–48)
MCH RBC QN AUTO: 28.8 PG (ref 27–31)
MCHC RBC AUTO-ENTMCNC: 33.2 G/DL (ref 32–36)
MCV RBC AUTO: 87 FL (ref 82–98)
MONOCYTES # BLD AUTO: 0.7 K/UL (ref 0.3–1)
MONOCYTES NFR BLD: 9.8 % (ref 4–15)
NEUTROPHILS # BLD AUTO: 5.1 K/UL (ref 1.8–7.7)
NEUTROPHILS NFR BLD: 68.3 % (ref 38–73)
NRBC BLD-RTO: 0 /100 WBC
PLATELET # BLD AUTO: 169 K/UL (ref 150–450)
PMV BLD AUTO: 11.8 FL (ref 9.2–12.9)
RBC # BLD AUTO: 4.51 M/UL (ref 4–5.4)
WBC # BLD AUTO: 7.48 K/UL (ref 3.9–12.7)

## 2022-05-20 PROCEDURE — 86900 BLOOD TYPING SEROLOGIC ABO: CPT | Performed by: ADVANCED PRACTICE MIDWIFE

## 2022-05-20 PROCEDURE — 59025 FETAL NON-STRESS TEST: CPT

## 2022-05-20 PROCEDURE — 63600175 PHARM REV CODE 636 W HCPCS: Performed by: ADVANCED PRACTICE MIDWIFE

## 2022-05-20 PROCEDURE — 86850 RBC ANTIBODY SCREEN: CPT | Performed by: ADVANCED PRACTICE MIDWIFE

## 2022-05-20 PROCEDURE — 11000001 HC ACUTE MED/SURG PRIVATE ROOM

## 2022-05-20 PROCEDURE — 25000003 PHARM REV CODE 250: Performed by: ADVANCED PRACTICE MIDWIFE

## 2022-05-20 PROCEDURE — 59025 PRENATAL TESTING - NST/AFI: ICD-10-PCS | Mod: 26,,, | Performed by: OBSTETRICS & GYNECOLOGY

## 2022-05-20 PROCEDURE — 59025 FETAL NON-STRESS TEST: CPT | Mod: 26,,, | Performed by: OBSTETRICS & GYNECOLOGY

## 2022-05-20 PROCEDURE — 85025 COMPLETE CBC W/AUTO DIFF WBC: CPT | Performed by: ADVANCED PRACTICE MIDWIFE

## 2022-05-20 RX ORDER — TRANEXAMIC ACID 100 MG/ML
1000 INJECTION, SOLUTION INTRAVENOUS ONCE AS NEEDED
Status: DISCONTINUED | OUTPATIENT
Start: 2022-05-20 | End: 2022-05-21

## 2022-05-20 RX ORDER — PROCHLORPERAZINE EDISYLATE 5 MG/ML
5 INJECTION INTRAMUSCULAR; INTRAVENOUS EVERY 6 HOURS PRN
Status: DISCONTINUED | OUTPATIENT
Start: 2022-05-20 | End: 2022-05-21

## 2022-05-20 RX ORDER — ONDANSETRON 8 MG/1
8 TABLET, ORALLY DISINTEGRATING ORAL EVERY 8 HOURS PRN
Status: DISCONTINUED | OUTPATIENT
Start: 2022-05-20 | End: 2022-05-21

## 2022-05-20 RX ORDER — SIMETHICONE 80 MG
1 TABLET,CHEWABLE ORAL 4 TIMES DAILY PRN
Status: DISCONTINUED | OUTPATIENT
Start: 2022-05-20 | End: 2022-05-21

## 2022-05-20 RX ORDER — LIDOCAINE HYDROCHLORIDE 10 MG/ML
10 INJECTION INFILTRATION; PERINEURAL ONCE AS NEEDED
Status: DISCONTINUED | OUTPATIENT
Start: 2022-05-20 | End: 2022-05-21

## 2022-05-20 RX ORDER — OXYTOCIN/RINGER'S LACTATE 30/500 ML
334 PLASTIC BAG, INJECTION (ML) INTRAVENOUS ONCE
Status: COMPLETED | OUTPATIENT
Start: 2022-05-20 | End: 2022-05-21

## 2022-05-20 RX ORDER — SODIUM CHLORIDE 9 MG/ML
INJECTION, SOLUTION INTRAVENOUS
Status: DISCONTINUED | OUTPATIENT
Start: 2022-05-20 | End: 2022-05-21

## 2022-05-20 RX ORDER — CALCIUM CARBONATE 200(500)MG
500 TABLET,CHEWABLE ORAL 3 TIMES DAILY PRN
Status: DISCONTINUED | OUTPATIENT
Start: 2022-05-20 | End: 2022-05-21

## 2022-05-20 RX ORDER — SODIUM CHLORIDE, SODIUM LACTATE, POTASSIUM CHLORIDE, CALCIUM CHLORIDE 600; 310; 30; 20 MG/100ML; MG/100ML; MG/100ML; MG/100ML
INJECTION, SOLUTION INTRAVENOUS CONTINUOUS
Status: DISCONTINUED | OUTPATIENT
Start: 2022-05-20 | End: 2022-05-21

## 2022-05-20 RX ORDER — OXYTOCIN/RINGER'S LACTATE 30/500 ML
0-30 PLASTIC BAG, INJECTION (ML) INTRAVENOUS CONTINUOUS
Status: DISCONTINUED | OUTPATIENT
Start: 2022-05-20 | End: 2022-05-21

## 2022-05-20 RX ADMIN — DEXTROSE 3 MILLION UNITS: 50 INJECTION, SOLUTION INTRAVENOUS at 07:05

## 2022-05-20 RX ADMIN — DEXTROSE 5 MILLION UNITS: 50 INJECTION, SOLUTION INTRAVENOUS at 04:05

## 2022-05-20 RX ADMIN — Medication 2 MILLI-UNITS/MIN: at 04:05

## 2022-05-20 RX ADMIN — SODIUM CHLORIDE, SODIUM LACTATE, POTASSIUM CHLORIDE, AND CALCIUM CHLORIDE: .6; .31; .03; .02 INJECTION, SOLUTION INTRAVENOUS at 04:05

## 2022-05-20 NOTE — SUBJECTIVE & OBJECTIVE
Obstetric HPI:  Patient reports very irregular cramping, active fetal movement, No vaginal bleeding , No loss of fluid     This pregnancy has been complicated by non- reactive NST and post-term.    OB History    Para Term  AB Living   1 0 0 0 0 0   SAB IAB Ectopic Multiple Live Births   0 0 0 0 0      # Outcome Date GA Lbr Samm/2nd Weight Sex Delivery Anes PTL Lv   1 Current              Past Medical History:   Diagnosis Date    Anemia affecting pregnancy in third trimester 3/31/2022    3/31/22 - Discussed 28w labs and recommend iron supplement.  [x]Repeat CBC at 36w -- RESOLVED     No past surgical history on file.    PTA Medications   Medication Sig    PRENATAL 28 mg iron- 800 mcg Tab Take 1 tablet by mouth once daily.    prenatal vit-iron fum-folic ac 28 mg iron- 800 mcg Tab Take 1 tablet by mouth once daily.       Review of patient's allergies indicates:  No Known Allergies     Family History       Problem Relation (Age of Onset)    Breast cancer Mother          Tobacco Use    Smoking status: Never Smoker    Smokeless tobacco: Never Used   Substance and Sexual Activity    Alcohol use: Not Currently    Drug use: Never    Sexual activity: Yes     Partners: Male     Birth control/protection: None     Comment: Isaiah     Review of Systems   Constitutional: Negative.    HENT: Negative.     Eyes: Negative.    Respiratory:  Negative for cough and shortness of breath.    Cardiovascular: Negative.    Gastrointestinal: Negative.    Genitourinary: Negative.    Musculoskeletal: Negative.    Integumentary:  Negative.   Neurological: Negative.    Psychiatric/Behavioral: Negative.      Objective:     Vital Signs (Most Recent):  Temp: 97.9 °F (36.6 °C) (22 1500)  Pulse: 60 (22 1729)  Resp: 18 (22 1500)  BP: 130/87 (22 1729)  SpO2: 97 % (22 1729) Vital Signs (24h Range):  Temp:  [97.9 °F (36.6 °C)] 97.9 °F (36.6 °C)  Pulse:  [57-62] 60  Resp:  [18] 18  SpO2:  [95 %-100 %] 97 %  BP:  (126-135)/(79-91) 130/87     Weight: 70 kg (154 lb 5.2 oz)  Body mass index is 23.94 kg/m².    FHT: 140's Cat 2 (reassuring), non-reactive NST in PNT  TOCO:  Q 35 minutes    Physical Exam:   Constitutional: She is oriented to person, place, and time. She appears well-developed and well-nourished.    HENT:   Head: Normocephalic.    Eyes: Conjunctivae and EOM are normal.     Cardiovascular:  Normal rate, regular rhythm and normal heart sounds.             Pulmonary/Chest: Effort normal and breath sounds normal.        Abdominal: Soft. Bowel sounds are normal.   EFW- 7#, ROT,      Genitourinary:    Vagina normal.             Musculoskeletal: Normal range of motion.       Neurological: She is alert and oriented to person, place, and time.    Skin: Skin is warm and dry.    Psychiatric: She has a normal mood and affect. Her behavior is normal.     Cervix:  Dilation:  3  Effacement:  85%  Station: -2/-1  Presentation: Vertex     Significant Labs:  Lab Results   Component Value Date    GROUPTRH O POS 05/20/2022    HEPBSAG Negative 09/23/2021    STREPBCULT (A) 04/14/2022     STREPTOCOCCUS AGALACTIAE (GROUP B)  In case of Penicillin allergy, call lab for further testing.  Beta-hemolytic streptococci are routinely susceptible to   penicillins,cephalosporins and carbapenems.         I have personallly reviewed all pertinent lab results from the last 24 hours.

## 2022-05-20 NOTE — ANESTHESIA PREPROCEDURE EVALUATION
Marianela Contreras is a 36 y.o. female  @ 41w2d who presents for scheduled IOL. Reports uncomplicated pregnancy course. Denies any significant PMH.    OB History    Para Term  AB Living   1             SAB IAB Ectopic Multiple Live Births                  # Outcome Date GA Lbr Samm/2nd Weight Sex Delivery Anes PTL Lv   1 Current                Wt Readings from Last 1 Encounters:   22 1500 70 kg (154 lb 5.2 oz)       BP Readings from Last 3 Encounters:   22 (!) 135/91   22 112/68   22 118/71       Patient Active Problem List   Diagnosis    HPV (human papilloma virus) infection    Pregnancy    Primigravida of advanced maternal age in third trimester    Birth Center Risk Assessment: 0- Meets birth center guidelines    Trichomonal vaginitis during pregnancy, antepartum    GBS (group B Streptococcus carrier), +RV culture, currently pregnant       No past surgical history on file.    Social History     Socioeconomic History    Marital status: Single   Tobacco Use    Smoking status: Never Smoker    Smokeless tobacco: Never Used   Substance and Sexual Activity    Alcohol use: Not Currently    Drug use: Never    Sexual activity: Yes     Partners: Male     Birth control/protection: None     Comment: Isaiah   Social History Narrative    She is a  at [employer].          Chemistry    No results found for: NA, K, CL, CO2, BUN, CREATININE, GLU No results found for: CALCIUM, ALKPHOS, AST, ALT, BILITOT, ESTGFRAFRICA, EGFRNONAA         Lab Results   Component Value Date    WBC 5.21 2022    HGB 12.4 2022    HCT 38.3 2022    MCV 89 2022     2022       No results for input(s): PT, INR, PROTIME, APTT in the last 72 hours.                           Pre-op Assessment    I have reviewed the Patient Summary Reports.     I have reviewed the Nursing Notes.    I have reviewed the Medications.     Review of  Systems  Anesthesia Hx:  No previous Anesthesia  Neg history of prior surgery. Denies Family Hx of Anesthesia complications.   Denies Personal Hx of Anesthesia complications.   Hematology/Oncology:  Hematology Normal        EENT/Dental:EENT/Dental Normal   Cardiovascular:  Cardiovascular Normal Exercise tolerance: good     Pulmonary:  Pulmonary Normal    Renal/:  Renal/ Normal     Hepatic/GI:  Hepatic/GI Normal    Musculoskeletal:  Musculoskeletal Normal    Neurological:  Neurology Normal    Endocrine:  Endocrine Normal    Psych:  Psychiatric Normal           Physical Exam  General: Well nourished    Airway:  Mallampati: II / II  Mouth Opening: Normal  TM Distance: Normal  Tongue: Normal  Neck ROM: Normal ROM    Dental:  Intact        Anesthesia Plan  Type of Anesthesia, risks & benefits discussed:    Anesthesia Type: Epidural, Spinal, CSE  Intra-op Monitoring Plan: Standard ASA Monitors  Post Op Pain Control Plan: multimodal analgesia, IV/PO Opioids PRN and epidural analgesia  Induction:  IV  Informed Consent: Informed consent signed with the Patient and all parties understand the risks and agree with anesthesia plan.  All questions answered.   ASA Score: 2    Ready For Surgery From Anesthesia Perspective.     .

## 2022-05-20 NOTE — H&P
[facility] - Labor & Delivery  Obstetrics  History & Physical    Patient Name: Marianela Contreras  MRN: [id number]  Admission Date: 2022  Primary Care Provider: Taty Adan MD    Subjective:     Principal Problem:Encounter for induction of labor    History of Present Illness:  No notes on file    Obstetric HPI:  Patient reports very irregular cramping, active fetal movement, No vaginal bleeding , No loss of fluid     This pregnancy has been complicated by non- reactive NST and post-term.    OB History    Para Term  AB Living   1 0 0 0 0 0   SAB IAB Ectopic Multiple Live Births   0 0 0 0 0      # Outcome Date GA Lbr Samm/2nd Weight Sex Delivery Anes PTL Lv   1 Current              Past Medical History:   Diagnosis Date    Anemia affecting pregnancy in third trimester 3/31/2022    3/31/22 - Discussed 28w labs and recommend iron supplement.  [x]Repeat CBC at 36w -- RESOLVED     No past surgical history on file.    PTA Medications   Medication Sig    PRENATAL 28 mg iron- 800 mcg Tab Take 1 tablet by mouth once daily.    prenatal vit-iron fum-folic ac 28 mg iron- 800 mcg Tab Take 1 tablet by mouth once daily.       Review of patient's allergies indicates:  No Known Allergies     Family History       Problem Relation (Age of Onset)    Breast cancer Mother          Tobacco Use    Smoking status: Never Smoker    Smokeless tobacco: Never Used   Substance and Sexual Activity    Alcohol use: Not Currently    Drug use: Never    Sexual activity: Yes     Partners: Male     Birth control/protection: None     Comment: Isaiah     Review of Systems   Constitutional: Negative.    HENT: Negative.     Eyes: Negative.    Respiratory:  Negative for cough and shortness of breath.    Cardiovascular: Negative.    Gastrointestinal: Negative.    Genitourinary: Negative.    Musculoskeletal: Negative.    Integumentary:  Negative.   Neurological: Negative.    Psychiatric/Behavioral: Negative.      Objective:     Vital  Signs (Most Recent):  Temp: 97.9 °F (36.6 °C) (22 1500)  Pulse: 60 (22 1729)  Resp: 18 (22 1500)  BP: 130/87 (22 1729)  SpO2: 97 % (22 1729) Vital Signs (24h Range):  Temp:  [97.9 °F (36.6 °C)] 97.9 °F (36.6 °C)  Pulse:  [57-62] 60  Resp:  [18] 18  SpO2:  [95 %-100 %] 97 %  BP: (126-135)/(79-91) 130/87     Weight: 70 kg (154 lb 5.2 oz)  Body mass index is 23.94 kg/m².    FHT: 140's Cat 2 (reassuring), non-reactive NST in PNT  TOCO:  Q 35 minutes    Physical Exam:   Constitutional: She is oriented to person, place, and time. She appears well-developed and well-nourished.    HENT:   Head: Normocephalic.    Eyes: Conjunctivae and EOM are normal.     Cardiovascular:  Normal rate, regular rhythm and normal heart sounds.             Pulmonary/Chest: Effort normal and breath sounds normal.        Abdominal: Soft. Bowel sounds are normal.   EFW- 7#, ROT,      Genitourinary:    Vagina normal.             Musculoskeletal: Normal range of motion.       Neurological: She is alert and oriented to person, place, and time.    Skin: Skin is warm and dry.    Psychiatric: She has a normal mood and affect. Her behavior is normal.     Cervix:  Dilation:  3  Effacement:  85%  Station: -2/-1  Presentation: Vertex     Significant Labs:  Lab Results   Component Value Date    GROUPTRH O POS 2022    HEPBSAG Negative 2021    STREPBCULT (A) 2022     STREPTOCOCCUS AGALACTIAE (GROUP B)  In case of Penicillin allergy, call lab for further testing.  Beta-hemolytic streptococci are routinely susceptible to   penicillins,cephalosporins and carbapenems.         I have personallly reviewed all pertinent lab results from the last 24 hours.    Assessment/Plan:     36 y.o. female  at 41w2d for:    * Encounter for induction of labor  IOL d/t non-reactive NST in PNT  41w2, late term  Start IV Pitocin, cervix is favorable  cEFM, vitals per protocol  Epidural per anesthesia per pt. Request  Monitor  progress  SVE- prn, ~4-6 hours    GBS (group B Streptococcus carrier), +RV culture, currently pregnant  (x)Prophylaxis in labor, PCN per protocol    Dr Hamilton and Dr Rodriguez given updates      Wendy D Gerhardt, CNM  Obstetrics  [facility] - Labor & Delivery

## 2022-05-21 PROBLEM — Z13.9 RISK AND FUNCTIONAL ASSESSMENT: Status: RESOLVED | Noted: 2022-04-15 | Resolved: 2022-05-21

## 2022-05-21 PROBLEM — Z34.90 ENCOUNTER FOR INDUCTION OF LABOR: Status: RESOLVED | Noted: 2022-05-20 | Resolved: 2022-05-21

## 2022-05-21 LAB
BACTERIAL VAGINOSIS DNA: NEGATIVE
BASOPHILS # BLD AUTO: 0.03 K/UL (ref 0–0.2)
BASOPHILS NFR BLD: 0.2 % (ref 0–1.9)
CANDIDA GLABRATA DNA: NEGATIVE
CANDIDA KRUSEI DNA: NEGATIVE
CANDIDA RRNA VAG QL PROBE: NEGATIVE
DIFFERENTIAL METHOD: ABNORMAL
EOSINOPHIL # BLD AUTO: 0 K/UL (ref 0–0.5)
EOSINOPHIL NFR BLD: 0.1 % (ref 0–8)
ERYTHROCYTE [DISTWIDTH] IN BLOOD BY AUTOMATED COUNT: 14.8 % (ref 11.5–14.5)
HCT VFR BLD AUTO: 32.3 % (ref 37–48.5)
HGB BLD-MCNC: 10.8 G/DL (ref 12–16)
IMM GRANULOCYTES # BLD AUTO: 0.08 K/UL (ref 0–0.04)
IMM GRANULOCYTES NFR BLD AUTO: 0.5 % (ref 0–0.5)
LYMPHOCYTES # BLD AUTO: 1.6 K/UL (ref 1–4.8)
LYMPHOCYTES NFR BLD: 10.2 % (ref 18–48)
MCH RBC QN AUTO: 28.9 PG (ref 27–31)
MCHC RBC AUTO-ENTMCNC: 33.4 G/DL (ref 32–36)
MCV RBC AUTO: 86 FL (ref 82–98)
MONOCYTES # BLD AUTO: 1.5 K/UL (ref 0.3–1)
MONOCYTES NFR BLD: 9.1 % (ref 4–15)
NEUTROPHILS # BLD AUTO: 12.7 K/UL (ref 1.8–7.7)
NEUTROPHILS NFR BLD: 79.9 % (ref 38–73)
NRBC BLD-RTO: 0 /100 WBC
PLATELET # BLD AUTO: 162 K/UL (ref 150–450)
PMV BLD AUTO: 11.8 FL (ref 9.2–12.9)
RBC # BLD AUTO: 3.74 M/UL (ref 4–5.4)
T VAGINALIS RRNA GENITAL QL PROBE: NEGATIVE
WBC # BLD AUTO: 15.93 K/UL (ref 3.9–12.7)

## 2022-05-21 PROCEDURE — 72200005 HC VAGINAL DELIVERY LEVEL II

## 2022-05-21 PROCEDURE — 63600175 PHARM REV CODE 636 W HCPCS: Performed by: ADVANCED PRACTICE MIDWIFE

## 2022-05-21 PROCEDURE — 25000003 PHARM REV CODE 250: Performed by: ADVANCED PRACTICE MIDWIFE

## 2022-05-21 PROCEDURE — 72100003 HC LABOR CARE, EA. ADDL. 8 HRS

## 2022-05-21 PROCEDURE — 85025 COMPLETE CBC W/AUTO DIFF WBC: CPT | Performed by: OBSTETRICS & GYNECOLOGY

## 2022-05-21 PROCEDURE — 59400 OBSTETRICAL CARE: CPT | Mod: GB,,, | Performed by: ADVANCED PRACTICE MIDWIFE

## 2022-05-21 PROCEDURE — 59400 PR FULL ROUT OBSTE CARE,VAGINAL DELIV: ICD-10-PCS | Mod: GB,,, | Performed by: ADVANCED PRACTICE MIDWIFE

## 2022-05-21 PROCEDURE — 72100002 HC LABOR CARE, 1ST 8 HOURS

## 2022-05-21 PROCEDURE — 11000001 HC ACUTE MED/SURG PRIVATE ROOM

## 2022-05-21 PROCEDURE — 36415 COLL VENOUS BLD VENIPUNCTURE: CPT | Performed by: OBSTETRICS & GYNECOLOGY

## 2022-05-21 PROCEDURE — 25000003 PHARM REV CODE 250

## 2022-05-21 RX ORDER — PROCHLORPERAZINE EDISYLATE 5 MG/ML
5 INJECTION INTRAMUSCULAR; INTRAVENOUS EVERY 6 HOURS PRN
Status: DISCONTINUED | OUTPATIENT
Start: 2022-05-21 | End: 2022-05-23 | Stop reason: HOSPADM

## 2022-05-21 RX ORDER — LIDOCAINE HYDROCHLORIDE 10 MG/ML
INJECTION INFILTRATION; PERINEURAL
Status: COMPLETED
Start: 2022-05-21 | End: 2022-05-21

## 2022-05-21 RX ORDER — OXYTOCIN 10 [USP'U]/ML
INJECTION, SOLUTION INTRAMUSCULAR; INTRAVENOUS
Status: DISCONTINUED
Start: 2022-05-21 | End: 2022-05-21 | Stop reason: WASHOUT

## 2022-05-21 RX ORDER — ONDANSETRON 8 MG/1
8 TABLET, ORALLY DISINTEGRATING ORAL EVERY 8 HOURS PRN
Status: DISCONTINUED | OUTPATIENT
Start: 2022-05-21 | End: 2022-05-23 | Stop reason: HOSPADM

## 2022-05-21 RX ORDER — IBUPROFEN 600 MG/1
600 TABLET ORAL EVERY 6 HOURS PRN
Status: DISCONTINUED | OUTPATIENT
Start: 2022-05-21 | End: 2022-05-23 | Stop reason: HOSPADM

## 2022-05-21 RX ORDER — METHYLERGONOVINE MALEATE 0.2 MG/ML
200 INJECTION INTRAVENOUS
Status: DISCONTINUED | OUTPATIENT
Start: 2022-05-21 | End: 2022-05-21

## 2022-05-21 RX ORDER — ONDANSETRON 2 MG/ML
4 INJECTION INTRAMUSCULAR; INTRAVENOUS EVERY 12 HOURS PRN
Status: DISCONTINUED | OUTPATIENT
Start: 2022-05-21 | End: 2022-05-21

## 2022-05-21 RX ORDER — ACETAMINOPHEN 325 MG/1
650 TABLET ORAL EVERY 6 HOURS PRN
Status: DISCONTINUED | OUTPATIENT
Start: 2022-05-21 | End: 2022-05-23 | Stop reason: HOSPADM

## 2022-05-21 RX ORDER — MISOPROSTOL 200 UG/1
TABLET ORAL
Status: DISCONTINUED
Start: 2022-05-21 | End: 2022-05-21 | Stop reason: WASHOUT

## 2022-05-21 RX ORDER — PRENATAL WITH FERROUS FUM AND FOLIC ACID 3080; 920; 120; 400; 22; 1.84; 3; 20; 10; 1; 12; 200; 27; 25; 2 [IU]/1; [IU]/1; MG/1; [IU]/1; MG/1; MG/1; MG/1; MG/1; MG/1; MG/1; UG/1; MG/1; MG/1; MG/1; MG/1
1 TABLET ORAL DAILY
Status: DISCONTINUED | OUTPATIENT
Start: 2022-05-21 | End: 2022-05-23 | Stop reason: HOSPADM

## 2022-05-21 RX ORDER — SIMETHICONE 80 MG
1 TABLET,CHEWABLE ORAL EVERY 6 HOURS PRN
Status: DISCONTINUED | OUTPATIENT
Start: 2022-05-21 | End: 2022-05-23 | Stop reason: HOSPADM

## 2022-05-21 RX ORDER — DIPHENHYDRAMINE HCL 25 MG
25 CAPSULE ORAL EVERY 4 HOURS PRN
Status: DISCONTINUED | OUTPATIENT
Start: 2022-05-21 | End: 2022-05-23 | Stop reason: HOSPADM

## 2022-05-21 RX ORDER — METHYLERGONOVINE MALEATE 0.2 MG/ML
INJECTION INTRAVENOUS
Status: DISCONTINUED
Start: 2022-05-21 | End: 2022-05-21 | Stop reason: WASHOUT

## 2022-05-21 RX ORDER — HYDROCODONE BITARTRATE AND ACETAMINOPHEN 5; 325 MG/1; MG/1
1 TABLET ORAL EVERY 4 HOURS PRN
Status: DISCONTINUED | OUTPATIENT
Start: 2022-05-21 | End: 2022-05-23 | Stop reason: HOSPADM

## 2022-05-21 RX ORDER — HYDROCORTISONE 25 MG/G
CREAM TOPICAL 3 TIMES DAILY PRN
Status: DISCONTINUED | OUTPATIENT
Start: 2022-05-21 | End: 2022-05-23 | Stop reason: HOSPADM

## 2022-05-21 RX ORDER — OXYTOCIN/RINGER'S LACTATE 30/500 ML
95 PLASTIC BAG, INJECTION (ML) INTRAVENOUS ONCE
Status: DISCONTINUED | OUTPATIENT
Start: 2022-05-21 | End: 2022-05-23 | Stop reason: HOSPADM

## 2022-05-21 RX ORDER — LIDOCAINE HYDROCHLORIDE 10 MG/ML
20 INJECTION INFILTRATION; PERINEURAL ONCE
Status: COMPLETED | OUTPATIENT
Start: 2022-05-21 | End: 2022-05-21

## 2022-05-21 RX ORDER — MISOPROSTOL 200 UG/1
800 TABLET ORAL
Status: DISCONTINUED | OUTPATIENT
Start: 2022-05-21 | End: 2022-05-21

## 2022-05-21 RX ORDER — DOCUSATE SODIUM 100 MG/1
200 CAPSULE, LIQUID FILLED ORAL 2 TIMES DAILY PRN
Status: DISCONTINUED | OUTPATIENT
Start: 2022-05-21 | End: 2022-05-23 | Stop reason: HOSPADM

## 2022-05-21 RX ORDER — SODIUM CHLORIDE 0.9 % (FLUSH) 0.9 %
10 SYRINGE (ML) INJECTION
Status: DISCONTINUED | OUTPATIENT
Start: 2022-05-21 | End: 2022-05-23 | Stop reason: HOSPADM

## 2022-05-21 RX ORDER — CARBOPROST TROMETHAMINE 250 UG/ML
INJECTION, SOLUTION INTRAMUSCULAR
Status: DISCONTINUED
Start: 2022-05-21 | End: 2022-05-21 | Stop reason: WASHOUT

## 2022-05-21 RX ORDER — CARBOPROST TROMETHAMINE 250 UG/ML
250 INJECTION, SOLUTION INTRAMUSCULAR
Status: DISCONTINUED | OUTPATIENT
Start: 2022-05-21 | End: 2022-05-21

## 2022-05-21 RX ORDER — DIPHENHYDRAMINE HYDROCHLORIDE 50 MG/ML
25 INJECTION INTRAMUSCULAR; INTRAVENOUS EVERY 4 HOURS PRN
Status: DISCONTINUED | OUTPATIENT
Start: 2022-05-21 | End: 2022-05-23 | Stop reason: HOSPADM

## 2022-05-21 RX ORDER — DIPHENOXYLATE HYDROCHLORIDE AND ATROPINE SULFATE 2.5; .025 MG/1; MG/1
1 TABLET ORAL 4 TIMES DAILY PRN
Status: DISCONTINUED | OUTPATIENT
Start: 2022-05-21 | End: 2022-05-21

## 2022-05-21 RX ADMIN — DEXTROSE 3 MILLION UNITS: 50 INJECTION, SOLUTION INTRAVENOUS at 01:05

## 2022-05-21 RX ADMIN — LIDOCAINE HYDROCHLORIDE 20 ML: 10 INJECTION INFILTRATION; PERINEURAL at 08:05

## 2022-05-21 RX ADMIN — DEXTROSE 3 MILLION UNITS: 50 INJECTION, SOLUTION INTRAVENOUS at 05:05

## 2022-05-21 RX ADMIN — LIDOCAINE HYDROCHLORIDE 20 ML: 10 INJECTION, SOLUTION INFILTRATION; PERINEURAL at 08:05

## 2022-05-21 RX ADMIN — Medication 334 MILLI-UNITS/MIN: at 08:05

## 2022-05-21 RX ADMIN — DOCUSATE SODIUM 200 MG: 100 CAPSULE, LIQUID FILLED ORAL at 08:05

## 2022-05-21 NOTE — PLAN OF CARE
Problem: Adult Inpatient Plan of Care  Goal: Plan of Care Review  Outcome: Ongoing, Progressing  Goal: Patient-Specific Goal (Individualized)  Outcome: Ongoing, Progressing  Goal: Absence of Hospital-Acquired Illness or Injury  Outcome: Ongoing, Progressing  Goal: Optimal Comfort and Wellbeing  Outcome: Ongoing, Progressing  Goal: Readiness for Transition of Care  Outcome: Ongoing, Progressing     Problem:  Fall Injury Risk  Goal: Absence of Fall, Infant Drop and Related Injury  Outcome: Ongoing, Progressing     Problem: Infection  Goal: Absence of Infection Signs and Symptoms  Outcome: Ongoing, Progressing     Problem: Bleeding (Labor)  Goal: Hemostasis  Outcome: Ongoing, Progressing     Problem: Change in Fetal Wellbeing (Labor)  Goal: Stable Fetal Wellbeing  Outcome: Ongoing, Progressing     Problem: Delayed Labor Progression (Labor)  Goal: Effective Progression to Delivery  Outcome: Ongoing, Progressing

## 2022-05-21 NOTE — L&D DELIVERY NOTE
[facility] - Labor & Delivery  Vaginal Delivery   Operative Note    SUMMARY     Normal spontaneous vaginal delivery of live infant female at 0802, infant placed on maternal abdomen, stimulated and gently dried then placed skin to skin on maternal chest.  Infant delivered position OA over perineum.  Nuchal cord: No.    Spontaneous delivery of placenta (scultz mechanism--marginal cord insertion noted) and IV pitocin given (following delivery of infant) noting good uterine tone.  Second degree perineal laceration with R labial involvement. Repaired with 3.0 vicryl suture under local anesthesia (lidocaine1%).  Patient tolerated delivery well. Sponge needle and lap counted correctly x2. . Patient left in stable condition, assume routine pp care.    Indications: Encounter for induction of labor  Pregnancy complicated by:   Patient Active Problem List   Diagnosis    HPV (human papilloma virus) infection    Pregnancy    Primigravida of advanced maternal age in third trimester    Birth Center Risk Assessment: 0- Meets birth center guidelines    Trichomonal vaginitis during pregnancy, antepartum    GBS (group B Streptococcus carrier), +RV culture, currently pregnant    Encounter for induction of labor     Admitting GA: 41w3d    Delivery Information for Quentin Contreras    Birth information:  YOB: 2021   Time of birth: 8:07 AM   Sex: female   Head Delivery Date/Time: 5/21/2022  8:07 AM   Delivery type: Vaginal, Spontaneous   Gestational Age: 41w3d    Delivery Providers    Delivering clinician: Elizabeth Dean CNM   Provider Role    Randa Bravo, RN Registered Nurse    Almas Neumann, RN Registered Nurse    Constance Rowan, RN Registered Nurse            Measurements    Weight:   Length:          Apgars    Living status: Living  Apgars:  1 min.:  5 min.:  10 min.:  15 min.:  20 min.:    Skin color:  1  1       Heart rate:  2  2       Reflex irritability:  2  2       Muscle tone:  2  2        Respiratory effort:  2  2       Total:  9  9              Operative Delivery    Forceps attempted?: No  Vacuum extractor attempted?: No         Shoulder Dystocia    Shoulder dystocia present?: No           Presentation    Presentation: Vertex  Position: Right Occiput Anterior           Interventions/Resuscitation    Method: None, Tactile Stimulation       Cord    Vessels: 3 vessels  Complications: None  Delayed Cord Clamping?: Yes  Cord Clamped Date/Time: 2022  8:10 AM  Cord Blood Disposition: Sent with Baby  Gases Sent?: No  Stem Cell Collection (by MD): No       Placenta    Placenta delivery date/time: 2022 0814  Placenta removal: Spontaneous  Placenta appearance: Intact  Placenta disposition: family           Labor Events:       labor: No     Labor Onset Date/Time:         Dilation Complete Date/Time:         Start Pushing Date/Time:         Start Pushing Date/Time:       Rupture Date/Time:            Rupture type:          Fluid Amount:       Fluid Color:       Fluid Odor:       Membrane Status: INT (Intact)               steroids:       Antibiotics given for GBS: Yes     Induction: oxytocin     Indications for induction:  Fetal Heart Rate or Rhythm Abnormality     Augmentation:       Indications for augmentation:       Labor complications: None     Additional complications:          Cervical ripening:                     Delivery:      Episiotomy: None     Indication for Episiotomy:       Perineal Lacerations: 2nd Repaired:      Periurethral Laceration:   Repaired:     Labial Laceration:   Repaired:     Sulcus Laceration:   Repaired:     Vaginal Laceration:   Repaired:     Cervical Laceration:   Repaired:     Repair suture:       Repair # of packets: 2     Last Value - EBL - Nursing (mL):       Sum - EBL - Nursing (mL): 0     Last Value - EBL - Anesthesia (mL):      Calculated QBL (mL): 597      Vaginal Sweep Performed: No     Surgicount Correct: Yes       Other providers:        Anesthesia    Method: Local, Other  Analgesics: LIDOCAINE          Details (if applicable):  Trial of Labor      Categorization:      Priority:     Indications for :     Incision Type:       Additional  information:  Forceps:    Vacuum:    Breech:    Observed anomalies    Other (Comments): Patient admitted 22 for IOL secondary to non-reassuring findings at prenatal testing (post term at 41w2d).  Assume care of patient around 0705 22, report received from Wendy Gerhardt, CNM.   Patient pushing involuntarily with contractions.   Last SVE 8-/c/0.  Patient actively using nitrous oxide per face mask with contractions. Pushing intermittently with peak of contractions.  Confirmed complete and +2 station at 0720. Pushed effectively to  followed by rapidly expulsion of hea  d, restitution to ROT, followed by rapid expulsion of infant with maternal push. Infant female delivered at 0802, placed on maternal abdomen and vigorous apgars 9/9. Infant gently dried, placed skin to skin. Cord double clamped and cut by FOB followi  ng cessation of pulse. Infant remained on maternal abdomen. IV pitocin started after delivery of infant to reduce risk of pp hemorrhage. Placenta spontaneously delivered gerber mechanism, 3 v cord, marginal cord insertion noted--family wishes to cem  p placental. Fundus massaged to firm. Shallow second degree laceration noted with R labial involvement. Repaired with 3.0 vicryl in usual fashion after local lidocaine anesthetic (1% lidocaine: 20cc) injected. Patient tolerated repair, hemostasis not   ed.  (bleeding primarily from laceration).   Patient left in stable condition.

## 2022-05-21 NOTE — PLAN OF CARE
Plan of care reviewed with patient. Patient verbalized understanding. VSS. No pain meds given. Uterus midline and firm.  Moderate bleeding.   Pt voiding with adequate urine output. Pt up and ambulating.  No complaints throughout shift. Will continue to monitor.

## 2022-05-21 NOTE — SUBJECTIVE & OBJECTIVE
Interval History:  Marianela is a 36 y.o.  at 41w3d. She is doing well. Starting to feel pressure in the vagina, asking to be checked.    Objective:     Vital Signs (Most Recent):  Temp: 97 °F (36.1 °C) (22)  Pulse: 63 (22)  Resp: 18 (22)  BP: (!) 143/79 (22)  SpO2: (!) 94 % (22)   Vital Signs (24h Range):  Temp:  [97 °F (36.1 °C)-97.9 °F (36.6 °C)] 97 °F (36.1 °C)  Pulse:  [53-76] 63  Resp:  [18] 18  SpO2:  [94 %-100 %] 94 %  BP: (121-143)/(73-91) 143/79     Weight: 70 kg (154 lb 5.2 oz)  Body mass index is 23.94 kg/m².    FHT: 130's Cat 1 (reassuring), good BTBV  TOCO:  Q 3-4 minutes    Cervical Exam:  Dilation:  6  Effacement:  85%  Station: -1  Presentation: Vertex     Significant Labs:  Lab Results   Component Value Date    GROUPTRH O POS 2022    HEPBSAG Negative 2021    STREPBCULT (A) 2022     STREPTOCOCCUS AGALACTIAE (GROUP B)  In case of Penicillin allergy, call lab for further testing.  Beta-hemolytic streptococci are routinely susceptible to   penicillins,cephalosporins and carbapenems.         I have personallly reviewed all pertinent lab results from the last 24 hours.    Physical Exam:   Constitutional: She is oriented to person, place, and time. She appears well-developed.    HENT:   Head: Normocephalic.    Eyes: Conjunctivae are normal.     Cardiovascular:  Normal rate and regular rhythm.             Pulmonary/Chest: Effort normal.        Abdominal: Soft.             Musculoskeletal: Normal range of motion.       Neurological: She is alert and oriented to person, place, and time.    Skin: Skin is warm.

## 2022-05-21 NOTE — LACTATION NOTE
Lactation attempted to visit pt. Pt asleep in the bed. Did not wake pt. Lactation to see the pt tomorrow.

## 2022-05-21 NOTE — PROGRESS NOTES
[facility] - Labor & Delivery  Obstetrics  Labor Progress Note    Patient Name: Marianela Contreras  MRN: [id number]  Admission Date: 2022  Hospital Length of Stay: 1 days  Attending Physician: Janny Hamilton MD  Primary Care Provider: Taty Adan MD    Subjective:     Principal Problem:Encounter for induction of labor    Hospital Course:  Admitted to L&D for IOL  Started with Pitocin, cervix was favorable  SVE on admit- 3/85/-2/vtx    22  0355- SVE: /-1/vtx    0615- Pushy,       Interval History:  Marianela is a 36 y.o.  at 41w3d. She is doing well. Starting to feel pushy, asking for Nitrous.    Objective:     Vital Signs (Most Recent):  Temp: 97.6 °F (36.4 °C) (22 0530)  Pulse: 76 (22 0530)  Resp: 18 (22 0530)  BP: 127/85 (22 0530)  SpO2: 100 % (22 0530) Vital Signs (24h Range):  Temp:  [97 °F (36.1 °C)-97.9 °F (36.6 °C)] 97.6 °F (36.4 °C)  Pulse:  [53-83] 76  Resp:  [18] 18  SpO2:  [94 %-100 %] 100 %  BP: (116-150)/(73-91) 127/85     Weight: 70 kg (154 lb 5.2 oz)  Body mass index is 23.94 kg/m².    FHT: 130 Cat 2 (reassuring), one short episode of FHT's 80's  TOCO:  Q 3-4 minutes    Cervical Exam:  Dilation:  8  Effacement:  95%  Station: 1  Presentation: Vertex     Significant Labs:  Lab Results   Component Value Date    GROUPTRH O POS 2022    HEPBSAG Negative 2021    STREPBCULT (A) 2022     STREPTOCOCCUS AGALACTIAE (GROUP B)  In case of Penicillin allergy, call lab for further testing.  Beta-hemolytic streptococci are routinely susceptible to   penicillins,cephalosporins and carbapenems.         I have personallly reviewed all pertinent lab results from the last 24 hours.        Assessment/Plan:     36 y.o. female  at 41w3d for:    * Encounter for induction of labor  Asking for Nitrous  Anesthesia in and started Nitrous  Anticipate            Wendy D Gerhardt, CNM  Obstetrics  [facility] - Labor & Delivery

## 2022-05-21 NOTE — PROGRESS NOTES
[facility] - Labor & Delivery  Obstetrics  Labor Progress Note    Patient Name: Marianela Contreras  MRN: [id number]  Admission Date: 2022  Hospital Length of Stay: 1 days  Attending Physician: Janny Hamilton MD  Primary Care Provider: Taty Adan MD    Subjective:     Principal Problem:Encounter for induction of labor    Hospital Course:  Admitted to L&D for IOL  Started with Pitocin, cervix was favorable  SVE on admit- 3/85/-2/vtx    22  035- SVE: /-1/vtx      Interval History:  Marianela is a 36 y.o.  at 41w3d. She is doing well. Starting to feel pressure in the vagina, asking to be checked.    Objective:     Vital Signs (Most Recent):  Temp: 97 °F (36.1 °C) (22)  Pulse: 63 (22)  Resp: 18 (22)  BP: (!) 143/79 (22)  SpO2: (!) 94 % (22)   Vital Signs (24h Range):  Temp:  [97 °F (36.1 °C)-97.9 °F (36.6 °C)] 97 °F (36.1 °C)  Pulse:  [53-76] 63  Resp:  [18] 18  SpO2:  [94 %-100 %] 94 %  BP: (121-143)/(73-91) 143/79     Weight: 70 kg (154 lb 5.2 oz)  Body mass index is 23.94 kg/m².    FHT: 130's Cat 1 (reassuring), good BTBV  TOCO:  Q 3-4 minutes    Cervical Exam:  Dilation:  6  Effacement:  85%  Station: -1  Presentation: Vertex     Significant Labs:  Lab Results   Component Value Date    GROUPTRH O POS 2022    HEPBSAG Negative 2021    STREPBCULT (A) 2022     STREPTOCOCCUS AGALACTIAE (GROUP B)  In case of Penicillin allergy, call lab for further testing.  Beta-hemolytic streptococci are routinely susceptible to   penicillins,cephalosporins and carbapenems.         I have personallly reviewed all pertinent lab results from the last 24 hours.    Physical Exam:   Constitutional: She is oriented to person, place, and time. She appears well-developed.    HENT:   Head: Normocephalic.    Eyes: Conjunctivae are normal.     Cardiovascular:  Normal rate and regular rhythm.             Pulmonary/Chest: Effort normal.        Abdominal: Soft.              Musculoskeletal: Normal range of motion.       Neurological: She is alert and oriented to person, place, and time.    Skin: Skin is warm.      Assessment/Plan:     36 y.o. female  at 41w3d for:    * Encounter for induction of labor  IOL d/t non-reactive NST in PNT  41w2, late term  Start IV Pitocin, cervix is favorable  cEFM, vitals per protocol  Epidural per anesthesia per pt. Request  Monitor progress  SVE- prn, ~4-6 hours    Cervical change  Continue same  Pitocin @ 16 mu/mn    GBS (group B Streptococcus carrier), +RV culture, currently pregnant  (x)Prophylaxis in labor, PCN per protocol          Wendy D Gerhardt, CNM  Obstetrics  [facility] - Labor & Delivery

## 2022-05-21 NOTE — SUBJECTIVE & OBJECTIVE
Interval History:  Marianela is a 36 y.o.  at 41w3d. She is doing well. Starting to feel contractions and is working well with them. FOB in room.    Objective:     Vital Signs (Most Recent):  Temp: 97 °F (36.1 °C) (22)  Pulse: 63 (22)  Resp: 18 (22)  BP: (!) 143/79 (22)  SpO2: (!) 94 % (22)   Vital Signs (24h Range):  Temp:  [97 °F (36.1 °C)-97.9 °F (36.6 °C)] 97 °F (36.1 °C)  Pulse:  [53-76] 63  Resp:  [18] 18  SpO2:  [94 %-100 %] 94 %  BP: (121-143)/(73-91) 143/79     Weight: 70 kg (154 lb 5.2 oz)  Body mass index is 23.94 kg/m².    FHT: 130 Cat 1 (reassuring) Good BTBV  TOCO:  cramping only     Cervical Exam:  Dilation:  3  Effacement:  85  Station: -2  Presentation: Vertex     Significant Labs:  Lab Results   Component Value Date    GROUPTRH O POS 2022    HEPBSAG Negative 2021    STREPBCULT (A) 2022     STREPTOCOCCUS AGALACTIAE (GROUP B)  In case of Penicillin allergy, call lab for further testing.  Beta-hemolytic streptococci are routinely susceptible to   penicillins,cephalosporins and carbapenems.         I have personallly reviewed all pertinent lab results from the last 24 hours.

## 2022-05-21 NOTE — PROGRESS NOTES
RN to bedside. Mother sleeping with baby in her arms and under the blankets. RN placed baby in crib and woke up mother. RN educated mom on risks of sleeping with baby in bed, including suffocation, death, and infant drops. Mother verbalizes understanding. Will continue to monitor.

## 2022-05-21 NOTE — HOSPITAL COURSE
Admitted to L&D for IOL  Started with Pitocin, cervix was favorable  SVE on admit- 3/85/-2/vtx    5/21/22  0355- SVE: 6/85/-1/vtx    0615- Pushy, 8/+1/95 5/22/22  PPD#1, Nl involution, see progress note. Wants to go home tomorrow.    5/23/22 PPD#2: Doing well. Speaking very positively about birth and postpartum experience and how quickly she has formed a bond with her baby. Normal involution. Patient desires to be discharged home today.

## 2022-05-21 NOTE — SUBJECTIVE & OBJECTIVE
Interval History:  Marianela is a 36 y.o.  at 41w3d. She is doing well. Starting to feel pushy, asking for Nitrous.    Objective:     Vital Signs (Most Recent):  Temp: 97.6 °F (36.4 °C) (22 0530)  Pulse: 76 (22 0530)  Resp: 18 (22 0530)  BP: 127/85 (22 0530)  SpO2: 100 % (22 05) Vital Signs (24h Range):  Temp:  [97 °F (36.1 °C)-97.9 °F (36.6 °C)] 97.6 °F (36.4 °C)  Pulse:  [53-83] 76  Resp:  [18] 18  SpO2:  [94 %-100 %] 100 %  BP: (116-150)/(73-91) 127/85     Weight: 70 kg (154 lb 5.2 oz)  Body mass index is 23.94 kg/m².    FHT: 130 Cat 2 (reassuring), one short episode of FHT's 80's  TOCO:  Q 3-4 minutes    Cervical Exam:  Dilation:  8  Effacement:  95%  Station: 1  Presentation: Vertex     Significant Labs:  Lab Results   Component Value Date    GROUPTRH O POS 2022    HEPBSAG Negative 2021    STREPBCULT (A) 2022     STREPTOCOCCUS AGALACTIAE (GROUP B)  In case of Penicillin allergy, call lab for further testing.  Beta-hemolytic streptococci are routinely susceptible to   penicillins,cephalosporins and carbapenems.         I have personallly reviewed all pertinent lab results from the last 24 hours.

## 2022-05-21 NOTE — PROGRESS NOTES
[facility] - Labor & Delivery  Obstetrics  Labor Progress Note    Patient Name: Marianela Contreras  MRN: [id number]  Admission Date: 2022  Hospital Length of Stay: 1 days  Attending Physician: Janny Hamilton MD  Primary Care Provider: Taty Adan MD    Subjective:     Principal Problem:Encounter for induction of labor    Hospital Course:  Admitted to L&D for IOL  Started with Pitocin, cervix was favorable  SVE on admit- 3/85/-2/vtx      Interval History:  Marianela is a 36 y.o.  at 41w3d. She is doing well. Starting to feel contractions and is working well with them. FOB in room.    Objective:     Vital Signs (Most Recent):  Temp: 97 °F (36.1 °C) (22)  Pulse: 63 (22)  Resp: 18 (22)  BP: (!) 143/79 (22)  SpO2: (!) 94 % (22)   Vital Signs (24h Range):  Temp:  [97 °F (36.1 °C)-97.9 °F (36.6 °C)] 97 °F (36.1 °C)  Pulse:  [53-76] 63  Resp:  [18] 18  SpO2:  [94 %-100 %] 94 %  BP: (121-143)/(73-91) 143/79     Weight: 70 kg (154 lb 5.2 oz)  Body mass index is 23.94 kg/m².    FHT: 130 Cat 1 (reassuring) Good BTBV  TOCO:  cramping only     Cervical Exam:  Dilation:  3  Effacement:  85  Station: -2  Presentation: Vertex     Significant Labs:  Lab Results   Component Value Date    GROUPTRH O POS 2022    HEPBSAG Negative 2021    STREPBCULT (A) 2022     STREPTOCOCCUS AGALACTIAE (GROUP B)  In case of Penicillin allergy, call lab for further testing.  Beta-hemolytic streptococci are routinely susceptible to   penicillins,cephalosporins and carbapenems.         I have personallly reviewed all pertinent lab results from the last 24 hours.        Assessment/Plan:     36 y.o. female  at 41w3d for:    No notes have been filed under this hospital service.  Service: Obstetrics and Gynecology        Juli D Gerhardt, CNM  Obstetrics  [facility] - Labor & Delivery

## 2022-05-21 NOTE — HPI
History of Present Illness:   Marianela Contreras is a 36 y.o. female  at 41w3d with Estimated Date of Delivery: 22 based on U/S who presents to Labor and Delivery for IOL after non-reactive NST in PNT, accompanied by FOB, Isaiah. Expecting a girl!    Reports no regular uterine contractions.  Having mild cramps , active fetal movement, No vaginal bleeding , No loss of fluid.     Last ate breakfast at 0900, last drank water in PNT.     This pregnancy has been complicated by late term pregnancy and non-reactive NST  Patient Active Problem List   Diagnosis    HPV (human papilloma virus) infection    Pregnancy    Primigravida of advanced maternal age in third trimester    Birth Center Risk Assessment: 0- Meets birth center guidelines    Trichomonal vaginitis during pregnancy, antepartum    GBS (group B Streptococcus carrier), +RV culture, currently pregnant    Encounter for induction of labor        Presentation: Vtx via leopolds and SVE  Estimated Fetal Weight: ~7#    Birth Center Risk Assessment: 1-management on labor and Delivery    CNM management in ABC  CNM management on L&D  Consultation with OB to develop  plan of care  Collaborative CNM/OB management with delivery on L&D  Permanent referral of care to MD

## 2022-05-22 PROCEDURE — 11000001 HC ACUTE MED/SURG PRIVATE ROOM

## 2022-05-22 PROCEDURE — 25000003 PHARM REV CODE 250: Performed by: ADVANCED PRACTICE MIDWIFE

## 2022-05-22 RX ADMIN — DOCUSATE SODIUM 200 MG: 100 CAPSULE, LIQUID FILLED ORAL at 09:05

## 2022-05-22 RX ADMIN — PRENATAL VIT W/ FE FUMARATE-FA TAB 27-0.8 MG 1 TABLET: 27-0.8 TAB at 08:05

## 2022-05-22 NOTE — DISCHARGE INSTRUCTIONS
Breastfeeding discharge instructions given with First Alert form and reviewed. Please complete First Alert within 3-5 days after the baby's birth. ( Please call the Breastfeeding Warmline ( [phone]) or the baby's pediatrician if you have any concerns.     Also discussed:  AAP recommendation of exclusive breastfeeding for the first 6 months of life and continued breastfeeding with the introduction of supplemental foods beyond the first year of life. The AAP recommends breastfeeding for for at least a year or longer. Instructed on the recommendation to delay all bottle and pacifier use until after 4 weeks of age and breastfeeding is well established.  Discussed the benefits of exclusive breastfeeding for both mother and baby.  Discussed the risks of supplementation/pacifier use on the exclusivity of breastfeeding in the first 6 months. Feed the baby at the earliest sign of hunger or comfort  Hands to mouth, sucking motions  Rooting or searching for something to suck on  Dont wait for crying - it is a not a late sign of hunger; it is a sign of distress    The feedings may be 8-12 times per 24hrs and will not follow a schedule  Alternate the breast you start the feeding with, or start with the breast that feels the fullest  Switch breasts when the baby takes himself off the breast or falls asleep  Keep offering breasts until the baby looks full, no longer gives hunger signs, and stays asleep when placed on his back in the crib  If the baby is sleepy and wont wake for a feeding, put the baby skin-to-skin dressed in a diaper against the mothers bare chest  Sleep near your baby  The baby should be positioned and latched on to the breast correctly  Chest-to-chest, chin in the breast  Babys lips are flipped outward  Babys mouth is stretched open wide like a shout  Babys sucking should feel like tugging to the mother  The baby should be drinking at the breast:  You should hear swallowing or gulping throughout  the feeding  You should see milk on the babys lips when he comes off the breast  Your breasts should be softer when the baby is finished feeding  The baby should look relaxed at the end of feedings  After the 4th day and your milk is in:  The babys poop should turn bright yellow and be loose, watery, and seedy  The baby should have at least 3-4 poops the size of the palm of your hand per day  The baby should have at least 6-8 wet diapers per day  The urine should be light yellow in color  You should drink when you are thirsty and eat a healthy diet when you are    hungry.     Take naps to get the rest you need.   Take medications and/or drink alcohol only with permission of your obstetrician    or the babys pediatrician.  You can also call the [facility],   ([phone]), Monday-Friday, 8am-5pm Central time, to get the most   up-to-date evidence-based information on the use of medications during   pregnancy and breastfeeding.      The baby should be examined by a pediatrician at 3-5 days of age and again at 2 weeks of age unless ordered sooner by the pediatrician.  Once your milk production increases the baby should gain at least 1/2-1oz each day and should be back to birth weight no later than 10-14 days of age.If this is not the case, please call the Breastfeeding Warmline ( [phone]) for assistance and support.

## 2022-05-22 NOTE — PLAN OF CARE
Patient ambulating and voiding independently. Fundus firm with moderate amount of lochia ice pack to perineum. Breastfeeding on demand. Bonding well with infant. Denies pain states cramps some after breastfeeding but not bad. Offered Ibuprofen patient refused.VSS. Will continue to monitor.

## 2022-05-22 NOTE — PROGRESS NOTES
05/22/22 1110   Maternal Assessment   Breast Shape Bilateral:;round   Breast Density Bilateral:;soft   Areola Bilateral:;firm   Nipples Bilateral:;everted   Left Nipple Symptoms bruised;painful   Right Nipple Symptoms painful   Maternal Infant Feeding   Maternal Emotional State assist needed   Infant Positioning cross-cradle   Signs of Milk Transfer audible swallow;infant jaw motion present;suck/swallow ratio   Pain with Feeding yes   Pain Location nipples, bilateral   Comfort Measures Before/During Feeding latch adjusted;infant position adjusted;suction broken using finger   Comfort Measures Following Feeding air-drying encouraged;expressed milk applied   Nipple Shape After Feeding, Left round   Nipple Shape After Feeding, Right round   Latch Assistance yes   Assisted with breastfeeding.  At first, pt kept complaining of continuous  pain with latch; assessed baby suck pattern using a gloved finger.  Baby exhibited tongue thrusting while sucking on finger. Took several attempts to get a deep latch with rhythmic suckling noted. Pt complained of pain with initial latch, but improved as the feeding progressed.  Discussed the use of EBM and lanolin for nipple pain; Patient prefers to use only EBM right now.  Also discussed the use of hydrogel pads that she can purchase after discharge, if the pain continues.  Discussed signs of adequate feedings (swallowing during feed, wet and dirty diapers, wt loss and gain patterns), as  well as, encouraged 8 or more feedings in 24 hours.  Praise and encouragement given.

## 2022-05-22 NOTE — PLAN OF CARE
Pt will breastfeed  8 or more times in 24 hours and monitor for signs of adequate feeding.  Pt will apply EBm to nipples after feedings to promote healing and ensure that baby gets a deep latch with feedings.  Pt will call the LC if further assistance is needed.

## 2022-05-22 NOTE — PLAN OF CARE
Plan of care reviewed with patient. Patient verbalized understanding. VSS. Pt not taking any pain meds.  Uterus midline and firm.  Light to Moderate bleeding.   Pt voiding with adequate urine output. Pt up and ambulating.  No complaints throughout shift. Will continue to monitor.

## 2022-05-23 ENCOUNTER — PATIENT MESSAGE (OUTPATIENT)
Dept: OBSTETRICS AND GYNECOLOGY | Facility: CLINIC | Age: 37
End: 2022-05-23
Payer: COMMERCIAL

## 2022-05-23 VITALS
DIASTOLIC BLOOD PRESSURE: 77 MMHG | OXYGEN SATURATION: 98 % | WEIGHT: 154.31 LBS | HEART RATE: 67 BPM | HEIGHT: 67 IN | TEMPERATURE: 98 F | SYSTOLIC BLOOD PRESSURE: 114 MMHG | RESPIRATION RATE: 18 BRPM | BODY MASS INDEX: 24.22 KG/M2

## 2022-05-23 PROCEDURE — 25000003 PHARM REV CODE 250: Performed by: ADVANCED PRACTICE MIDWIFE

## 2022-05-23 RX ADMIN — PRENATAL VIT W/ FE FUMARATE-FA TAB 27-0.8 MG 1 TABLET: 27-0.8 TAB at 08:05

## 2022-05-23 NOTE — PROGRESS NOTES
[facility] ([facility]  Obstetrics  Postpartum Progress Note    Patient Name: Marianela Contreras  MRN: [id number]  Admission Date: 2022  Hospital Length of Stay: 2 days  Attending Physician: Janny Hamilton MD  Primary Care Provider: Taty Adan MD    Subjective:     Principal Problem: (normal spontaneous vaginal delivery)    Hospital Course:  Admitted to L&D for IOL  Started with Pitocin, cervix was favorable  SVE on admit- 3/85/-2/vtx    22  0355- SVE: /-1/vtx    0615- Pushy, 8/22  PPD#1, Nl involution, see progress note. Wants to go home tomorrow.      Interval History: PPD#1    She is doing well this morning. She is tolerating a regular diet without nausea or vomiting. She is voiding spontaneously. She is ambulating. She has passed flatus, and has not a BM. Vaginal bleeding is mild. She denies fever or chills. Abdominal pain is mild and controlled with oral medications. She Is breastfeeding.     Objective:     Vital Signs (Most Recent):  Temp: 98.1 °F (36.7 °C) (22)  Pulse: 69 (22)  Resp: 18 (22)  BP: (!) 103/55 (22)  SpO2: 98 % (22)   Vital Signs (24h Range):  Temp:  [97.8 °F (36.6 °C)-98.4 °F (36.9 °C)] 98.1 °F (36.7 °C)  Pulse:  [60-73] 69  Resp:  [16-18] 18  SpO2:  [97 %-99 %] 98 %  BP: (103-109)/(52-55) 103/55     Weight: 70 kg (154 lb 5.2 oz)  Body mass index is 23.94 kg/m².      Intake/Output Summary (Last 24 hours) at 2022  Last data filed at 2022 0500  Gross per 24 hour   Intake --   Output 1100 ml   Net -1100 ml         Significant Labs:  Lab Results   Component Value Date    GROUPTRH O POS 2022    HEPBSAG Negative 2021    STREPBCULT (A) 2022     STREPTOCOCCUS AGALACTIAE (GROUP B)  In case of Penicillin allergy, call lab for further testing.  Beta-hemolytic streptococci are routinely susceptible to   penicillins,cephalosporins and carbapenems.       Recent Labs   Lab  22   HGB 10.8*   HCT 32.3*       I have personallly reviewed all pertinent lab results from the last 24 hours.    Physical Exam:   Constitutional: She is oriented to person, place, and time. She appears well-developed and well-nourished.    HENT:   Head: Normocephalic.    Eyes: EOM are normal.     Cardiovascular:  Normal rate.             Pulmonary/Chest: Effort normal. No respiratory distress.        Abdominal: Soft. She exhibits no distension.   FF @ 1 FB < U, Midline, Non- tender     Genitourinary:    Vagina normal.      Genitourinary Comments: Perineum approximated well, minimal swelling.  Mild serosa rubra lochia.               Musculoskeletal: Normal range of motion. No tenderness.       Neurological: She is alert and oriented to person, place, and time.    Skin: Skin is warm and dry.    Psychiatric: She has a normal mood and affect. Her behavior is normal.     Assessment/Plan:     36 y.o. female  for:    *  (normal spontaneous vaginal delivery)  PP D #1- Nl involution, wants to go home tomorrow    Lactating mother  Baby has a good latch and nurses well      Disposition: As patient meets milestones, will plan to discharge tomorrow.    Wendy D Gerhardt, CNM  Obstetrics  [facility]

## 2022-05-23 NOTE — PLAN OF CARE
Pt discharged to home in stable condition. Discharge instructions reviewed with pt and significant other. Pt v/u of the need to follow up with midwives within 6 weeks for a post-partal check up. Pt wheeled down to the 2nd floor of [facility], [place] by transport with baby in arms.

## 2022-05-23 NOTE — PLAN OF CARE
POC reviewed with patient, verbalized understanding. Fundus firm and bleeding moderate. Patient with no complaints of pain. Ambulating/voiding.

## 2022-05-23 NOTE — LACTATION NOTE
05/23/22 0830   Maternal Assessment   Breast Shape Bilateral:;round   Breast Density Bilateral:;soft   Areola Bilateral:;elastic   Nipples Right:;graspable;Left:;inverted   Left Nipple Symptoms redness;tender;bruised   Right Nipple Symptoms painful   Maternal Infant Feeding   Maternal Emotional State assist needed;relaxed   Infant Positioning cross-cradle   Signs of Milk Transfer   (none)   Pain with Feeding yes   Pain Location nipples, bilateral  (L score 8, R score 6)   Pain Description soreness   Comfort Measures Following Feeding air-drying encouraged;expressed milk applied;other (see comments)  (provided lanoln c instructions for use)   Latch Assistance yes  (max assist, suck dimples present, does not feel like the pump)   Equipment Type   Breast Pump Type double electric, hospital grade   Breast Pump Flange Type hard   Breast Pump Flange Size 24 mm   Breast Pumping   Breast Pumping Interventions post-feed pumping encouraged   Breast Pumping double electric breast pump utilized   Lactation Referrals   Lactation Referrals outpatient lactation program;pediatric care provider;support group

## 2022-05-23 NOTE — SUBJECTIVE & OBJECTIVE
Interval History: PPD#1    She is doing well this morning. She is tolerating a regular diet without nausea or vomiting. She is voiding spontaneously. She is ambulating. She has passed flatus, and has not a BM. Vaginal bleeding is mild. She denies fever or chills. Abdominal pain is mild and controlled with oral medications. She Is breastfeeding.     Objective:     Vital Signs (Most Recent):  Temp: 98.1 °F (36.7 °C) (05/22/22 1651)  Pulse: 69 (05/22/22 1651)  Resp: 18 (05/22/22 1651)  BP: (!) 103/55 (05/22/22 1651)  SpO2: 98 % (05/22/22 1651)   Vital Signs (24h Range):  Temp:  [97.8 °F (36.6 °C)-98.4 °F (36.9 °C)] 98.1 °F (36.7 °C)  Pulse:  [60-73] 69  Resp:  [16-18] 18  SpO2:  [97 %-99 %] 98 %  BP: (103-109)/(52-55) 103/55     Weight: 70 kg (154 lb 5.2 oz)  Body mass index is 23.94 kg/m².      Intake/Output Summary (Last 24 hours) at 5/22/2022 2222  Last data filed at 5/22/2022 0500  Gross per 24 hour   Intake --   Output 1100 ml   Net -1100 ml         Significant Labs:  Lab Results   Component Value Date    GROUPTRH O POS 05/20/2022    HEPBSAG Negative 09/23/2021    STREPBCULT (A) 04/14/2022     STREPTOCOCCUS AGALACTIAE (GROUP B)  In case of Penicillin allergy, call lab for further testing.  Beta-hemolytic streptococci are routinely susceptible to   penicillins,cephalosporins and carbapenems.       Recent Labs   Lab 05/21/22 1935   HGB 10.8*   HCT 32.3*       I have personallly reviewed all pertinent lab results from the last 24 hours.    Physical Exam:   Constitutional: She is oriented to person, place, and time. She appears well-developed and well-nourished.    HENT:   Head: Normocephalic.    Eyes: EOM are normal.     Cardiovascular:  Normal rate.             Pulmonary/Chest: Effort normal. No respiratory distress.        Abdominal: Soft. She exhibits no distension.   FF @ 1 FB < U, Midline, Non- tender     Genitourinary:    Vagina normal.      Genitourinary Comments: Perineum approximated well, minimal  swelling.  Mild serosa rubra lochia.               Musculoskeletal: Normal range of motion. No tenderness.       Neurological: She is alert and oriented to person, place, and time.    Skin: Skin is warm and dry.    Psychiatric: She has a normal mood and affect. Her behavior is normal.

## 2022-05-23 NOTE — PROGRESS NOTES
Health Maintenance Due   Topic Date Due   • Shingles Vaccine (1 of 2) 02/08/2011   • Influenza Vaccine (1) 09/01/2020       Patient is due for topics as listed above but is not proceeding with Immunization(s) Influenza at this time. Education provided for Immunization(s) Influenza.    Recent PHQ 2/9 Score    PHQ 2:  Date Adult PHQ 2 Score Adult PHQ 2 Interpretation   1/7/2021 0 No further screening needed       PHQ 9:       DEPRESSION ASSESSMENT/PLAN:  Depression screening is negative no further plan needed.               [facility]  Obstetrics  Postpartum Progress Note    Patient Name: Marianela Contreras  MRN: [id number]  Admission Date: 2022  Hospital Length of Stay: 3 days  Attending Physician: Janny Hamilton MD  Primary Care Provider: Taty Adan MD    Subjective:     Principal Problem: (normal spontaneous vaginal delivery)    Hospital Course:  Admitted to L&D for IOL  Started with Pitocin, cervix was favorable  SVE on admit- 3/85/-2/vtx    22  0355- SVE: /-1/vtx    0615- Pushy, 8/22  PPD#1, Nl involution, see progress note. Wants to go home tomorrow.    22 PPD#2: Doing well. Speaking very positively about birth and postpartum experience and how quickly she has formed a bond with her baby. Normal involution. Patient desires to be discharged home today.      Interval History:     Doing well, ambulating, voiding, and tolerating regular diet  Lochia: steadily decreasing  Pain: well controlled without medication  Breasts/nipples: Breast feeding and pumping well with some difficulty; has seen lactation  Depression/anxiety: Denies   Support at home: Yes  Contraception: considering NFP; understands that progesterone only options are appropriate with breastfeeding  [place]: Baby girl is doing well, will f/u with pediatrician       Objective:     Vital Signs (Most Recent):  Temp: 97.9 °F (36.6 °C) (22 08)  Pulse: 67 (22 08)  Resp: 18 (22)  BP: 114/77 (22 08)  SpO2: 98 % (22) Vital Signs (24h Range):  Temp:  [97.9 °F (36.6 °C)-98.2 °F (36.8 °C)] 97.9 °F (36.6 °C)  Pulse:  [61-69] 67  Resp:  [18] 18  SpO2:  [95 %-98 %] 98 %  BP: (103-114)/(55-77) 114/77     Weight: 70 kg (154 lb 5.2 oz)  Body mass index is 23.94 kg/m².    No intake or output data in the 24 hours ending 22 1325      Significant Labs:  Lab Results   Component Value Date    GROUPTRH O POS 2022    HEPBSAG Negative 2021    STREPBCULT (A) 2022      STREPTOCOCCUS AGALACTIAE (GROUP B)  In case of Penicillin allergy, call lab for further testing.  Beta-hemolytic streptococci are routinely susceptible to   penicillins,cephalosporins and carbapenems.       Recent Labs   Lab 22   HGB 10.8*   HCT 32.3*       I have personally reviewed all pertinent lab results from the last 24 hours.  Recent Lab Results       None            Physical Exam  Gen: A&O x 4, NAD  CV: normal HR  Lungs: normal resp effort  Breasts: bilaterally soft, non-tender, nipples intact (but patient states they are sore, so she pumps sometimes)  Abdomen: soft, non-tender, uterus firm at U - 2 fb  Perineum: approximated, no edema   Lochia: minimal rubra  Ext: bilaterally no pedal edema and no signs of DVT    Assessment/Plan:     36 y.o. female  for:    *  (normal spontaneous vaginal delivery)  PP D #1- Nl involution, wants to go home tomorrow    22 PPD#2: Doing well. Speaking very positively about birth and postpartum experience and how quickly she has formed a bond with her baby. Normal involution. Patient desires to be discharged home today.    Lactating mother  Baby has a good latch and nurses well    22 PPD#2: Nipples appear intact, but she is experiencing some nipple pain. She met with lactation consultant this morning and plans to alternate between breast feeding and pumping.    GBS (group B Streptococcus carrier), +RV culture, currently pregnant  Was treated in labor. Baby has been seen by on call peds.        Disposition: As patient meets milestones, will plan to discharge today.    Cindy Real CNM  Obstetrics  [facility]

## 2022-05-23 NOTE — PLAN OF CARE
"Visited patient in room, discharge instructions provided, Guide reviewed.  Mother c/o very sore nipples.  Baby giving feeding cues, observed mother position and latch baby onto R breast, cross cradle position, shallow painful latch achieved, suck dimples obvious.  Maximum positioning and attachment assistance provided, several attempts, R breast, cross cradle position, baby pulls back from a deep latch, suck dimples noted, mother c/o nipple pain.  Breastpump brought to mother, Initiation pumping pattern begun, unable to increase level of suction initially, mother states baby's suck does not feel like the pump obtained 35ml of transitional milk.  Paced bottle fed baby 35ml EBM, tolerated well.  Washed collection kit, air drying.  Plan:  mother will feed baby on cue "8 or more in 24"; will attempt to latch baby onto breast at each feeding and compare feel of pump to feel of baby's suck; if painful or if the suck does not feel correct, mother will  ue personal pump at home for 20min c the most suction that is comfortable and will Paced bottle feed baby ad neel; will monitor baby's 24hr diaper counts; will care for collection kit per 's instructions; will call [facility] prn.     "

## 2022-05-23 NOTE — DISCHARGE SUMMARY
[facility]  Obstetrics  Discharge Summary      Patient Name: Marianela Contreras  MRN: [id number]  Admission Date: 2022  Hospital Length of Stay: 3 days  Discharge Date and Time: 22  Attending Physician: Janny Hamilton MD   Discharging Provider: Cindy Real CNM   Primary Care Provider: Taty Adan MD    HPI:   History of Present Illness:   Marianela Contreras is a 36 y.o. female  at 41w3d with Estimated Date of Delivery: 22 based on U/S who presents to Labor and Delivery for IOL after non-reactive NST in PNT, accompanied by FOBIsaiah. Expecting a girl!    Reports no regular uterine contractions.  Having mild cramps , active fetal movement, No vaginal bleeding , No loss of fluid.     Last ate breakfast at 0900, last drank water in PNT.     This pregnancy has been complicated by late term pregnancy and non-reactive NST  Patient Active Problem List   Diagnosis    HPV (human papilloma virus) infection    Pregnancy    Primigravida of advanced maternal age in third trimester    Birth Center Risk Assessment: 0- Meets birth center guidelines    Trichomonal vaginitis during pregnancy, antepartum    GBS (group B Streptococcus carrier), +RV culture, currently pregnant    Encounter for induction of labor        Presentation: Vtx via leopolds and SVE  Estimated Fetal Weight: ~7#    Birth Center Risk Assessment: 1-management on labor and Delivery    0- CNM management in ABC  1- CNM management on L&D  2- Consultation with OB to develop  plan of care  3- Collaborative CNM/OB management with delivery on L&D  4- Permanent referral of care to MD             * No surgery found *     Hospital Course:   Admitted to L&D for IOL  Started with Pitocin, cervix was favorable  SVE on admit- 3/85/-2/vtx    22  0355- SVE: /-1/vtx    0615- Pushy, 8/+22  PPD#1, Nl involution, see progress note. Wants to go home tomorrow.    22 PPD#2: Doing well. Speaking very  positively about birth and postpartum experience and how quickly she has formed a bond with her baby. Normal involution. Patient desires to be discharged home today.       Consults (From admission, onward)        Status Ordering Provider     Consult to Lactation  Use PRN      Provider:  (Not yet assigned)    Acknowledged SEGUNDO BLACK          Final Active Diagnoses:    Diagnosis Date Noted POA    PRINCIPAL PROBLEM:   (normal spontaneous vaginal delivery) [O80]  Not Applicable    Lactating mother [Z39.1] 2022 Not Applicable    GBS (group B Streptococcus carrier), +RV culture, currently pregnant [O99.820] 2022 Not Applicable      Problems Resolved During this Admission:    Diagnosis Date Noted Date Resolved POA    Encounter for induction of labor [Z34.90] 2022 Not Applicable    Birth Center Risk Assessment: 0- Meets birth center guidelines [Z13.9] 04/15/2022 2022 Not Applicable        Significant Diagnostic Studies: Labs:   CBC   Recent Labs   Lab 22  1935   WBC 15.93*   HGB 10.8*   HCT 32.3*       and All labs within the past 24 hours have been reviewed      Feeding Method: breast and pumping    Immunizations     Date Immunization Status Dose Route/Site Given by    22 1005 MMR Deferred 0.5 mL Subcutaneous/ Nany Fine RN    22 1006 Tdap Deferred 0.5 mL Intramuscular/ Nany Fine RN          Delivery:    Episiotomy: None   Lacerations: 2nd   Repair suture:     Repair # of packets: 2   Blood loss (ml):       Birth information:  YOB: 2021   Time of birth: 8:07 AM   Sex: female   Delivery type: Vaginal, Spontaneous   Gestational Age: 41w3d    Delivery Clinician:      Other providers:       Additional  information:  Forceps:    Vacuum:    Breech:    Observed anomalies      Living?:           APGARS  One minute Five minutes Ten minutes   Skin color:         Heart rate:         Grimace:         Muscle tone:         Breathing:          Totals: 9  9        Placenta: Delivered:       appearance    Pending Diagnostic Studies:     None          Discharged Condition: stable    Disposition: Home or Self Care    Follow Up:   Follow-up Information     [facility] Schedule an appointment as soon as possible for a visit in 6 week(s).    Specialty: Obstetrics and Gynecology  Why: Routine postpartum visit  Contact information:  [address]  [facility] [address] [phone]  Additional information:  [facility] ([facility] 4th Floor   Please park in [address] and use [facility] elevators                     Patient Instructions:      Diet Adult Regular     Notify your health care provider if you experience any of the following:  temperature >100.4     Notify your health care provider if you experience any of the following:  persistent nausea and vomiting or diarrhea     Notify your health care provider if you experience any of the following:  severe uncontrolled pain     Notify your health care provider if you experience any of the following:  redness, tenderness, or signs of infection (pain, swelling, redness, odor or green/yellow discharge around incision site)     Notify your health care provider if you experience any of the following:  difficulty breathing or increased cough     Notify your health care provider if you experience any of the following:  severe persistent headache     Notify your health care provider if you experience any of the following:  worsening rash     Notify your health care provider if you experience any of the following:  persistent dizziness, light-headedness, or visual disturbances     Notify your health care provider if you experience any of the following:  increased confusion or weakness     Activity as tolerated     Follow Up/Patient Instructions:   1. Reviewed postpartum recommendations and precautions ~ encouraged to call for fever, severe  or increased pain in head, chest, abdomen, perineum or legs; heavy bleeding, foul smelling lochia, signs of depression/anxiety, or any other concern. Reviewed postpartum precautions r/t high blood pressure ~ encouraged to call for HA, vision changes, epigastric discomfort, or abnormal swelling.  2. Rx provided: n/a  3. Contraception: considering NFP; will f/u at postpartum visit. Encouraged abstinence and pelvic rest for healing until after postpartum check-up.   4. Encouraged to continue PNV while breastfeeding or at least for 6 weeks postpartum.   5. Car seat law will be reviewed with patient at discharge per protocol  6. RTO in 4-6 weeks or sooner prn.  7. Discharge home today with written and verbal postpartum instructions and precautions.       Medications:  Current Discharge Medication List      CONTINUE these medications which have NOT CHANGED    Details   prenatal vit-iron fum-folic ac 28 mg iron- 800 mcg Tab Take 1 tablet by mouth once daily.  Qty: 30 tablet, Refills: 9    Comments: Please dispense brand on formulary covered by patient's insurance  Associated Diagnoses: Amenorrhea         STOP taking these medications       PRENATAL 28 mg iron- 800 mcg Tab Comments:   Reason for Stopping:               Cindy Real CNM  Obstetrics  [facility]

## 2022-05-23 NOTE — SUBJECTIVE & OBJECTIVE
Interval History:     Doing well, ambulating, voiding, and tolerating regular diet  Lochia: steadily decreasing  Pain: well controlled without medication  Breasts/nipples: Breast feeding and pumping well with some difficulty; has seen lactation  Depression/anxiety: Denies   Support at home: Yes  Contraception: considering NFP; understands that progesterone only options are appropriate with breastfeeding  [place]: Baby girl is doing well, will f/u with pediatrician       Objective:     Vital Signs (Most Recent):  Temp: 97.9 °F (36.6 °C) (22)  Pulse: 67 (22)  Resp: 18 (22)  BP: 114/77 (22)  SpO2: 98 % (22) Vital Signs (24h Range):  Temp:  [97.9 °F (36.6 °C)-98.2 °F (36.8 °C)] 97.9 °F (36.6 °C)  Pulse:  [61-69] 67  Resp:  [18] 18  SpO2:  [95 %-98 %] 98 %  BP: (103-114)/(55-77) 114/77     Weight: 70 kg (154 lb 5.2 oz)  Body mass index is 23.94 kg/m².    No intake or output data in the 24 hours ending 22 1325      Significant Labs:  Lab Results   Component Value Date    GROUPTRH O POS 2022    HEPBSAG Negative 2021    STREPBCULT (A) 2022     STREPTOCOCCUS AGALACTIAE (GROUP B)  In case of Penicillin allergy, call lab for further testing.  Beta-hemolytic streptococci are routinely susceptible to   penicillins,cephalosporins and carbapenems.       Recent Labs   Lab 22   HGB 10.8*   HCT 32.3*       I have personally reviewed all pertinent lab results from the last 24 hours.  Recent Lab Results       None            Physical Exam  Gen: A&O x 4, NAD  CV: normal HR  Lungs: normal resp effort  Breasts: bilaterally soft, non-tender, nipples intact (but patient states they are sore, so she pumps sometimes)  Abdomen: soft, non-tender, uterus firm at U - 2 fb  Perineum: approximated, no edema   Lochia: minimal rubra  Ext: bilaterally no pedal edema and no signs of DVT

## 2022-05-23 NOTE — ASSESSMENT & PLAN NOTE
Baby has a good latch and nurses well    5/23/22 PPD#2: Nipples appear intact, but she is experiencing some nipple pain. She met with lactation consultant this morning and plans to alternate between breast feeding and pumping.

## 2022-05-23 NOTE — ASSESSMENT & PLAN NOTE
PP D #1- Nl involution, wants to go home tomorrow    5/23/22 PPD#2: Doing well. Speaking very positively about birth and postpartum experience and how quickly she has formed a bond with her baby. Normal involution. Patient desires to be discharged home today.

## 2022-06-11 ENCOUNTER — PATIENT MESSAGE (OUTPATIENT)
Dept: OBSTETRICS AND GYNECOLOGY | Facility: CLINIC | Age: 37
End: 2022-06-11
Payer: COMMERCIAL

## 2022-06-28 ENCOUNTER — POSTPARTUM VISIT (OUTPATIENT)
Dept: OBSTETRICS AND GYNECOLOGY | Facility: CLINIC | Age: 37
End: 2022-06-28
Payer: COMMERCIAL

## 2022-06-28 VITALS
SYSTOLIC BLOOD PRESSURE: 112 MMHG | DIASTOLIC BLOOD PRESSURE: 72 MMHG | HEIGHT: 67 IN | BODY MASS INDEX: 21.77 KG/M2 | WEIGHT: 138.69 LBS

## 2022-06-28 DIAGNOSIS — B97.7 HPV (HUMAN PAPILLOMA VIRUS) INFECTION: ICD-10-CM

## 2022-06-28 PROBLEM — Z34.90 PREGNANCY: Status: RESOLVED | Noted: 2022-03-31 | Resolved: 2022-06-28

## 2022-06-28 PROBLEM — O99.820 GBS (GROUP B STREPTOCOCCUS CARRIER), +RV CULTURE, CURRENTLY PREGNANT: Status: RESOLVED | Noted: 2022-04-16 | Resolved: 2022-06-28

## 2022-06-28 PROBLEM — O23.599 TRICHOMONAL VAGINITIS DURING PREGNANCY, ANTEPARTUM: Status: RESOLVED | Noted: 2022-04-15 | Resolved: 2022-06-28

## 2022-06-28 PROBLEM — O09.513 PRIMIGRAVIDA OF ADVANCED MATERNAL AGE IN THIRD TRIMESTER: Status: RESOLVED | Noted: 2022-03-31 | Resolved: 2022-06-28

## 2022-06-28 PROBLEM — A59.01 TRICHOMONAL VAGINITIS DURING PREGNANCY, ANTEPARTUM: Status: RESOLVED | Noted: 2022-04-15 | Resolved: 2022-06-28

## 2022-06-28 PROCEDURE — 99999 PR PBB SHADOW E&M-EST. PATIENT-LVL III: CPT | Mod: PBBFAC,,, | Performed by: ADVANCED PRACTICE MIDWIFE

## 2022-06-28 PROCEDURE — 0503F PR POSTPARTUM CARE VISIT: ICD-10-PCS | Mod: S$GLB,,, | Performed by: ADVANCED PRACTICE MIDWIFE

## 2022-06-28 PROCEDURE — 0503F POSTPARTUM CARE VISIT: CPT | Mod: S$GLB,,, | Performed by: ADVANCED PRACTICE MIDWIFE

## 2022-06-28 PROCEDURE — 99999 PR PBB SHADOW E&M-EST. PATIENT-LVL III: ICD-10-PCS | Mod: PBBFAC,,, | Performed by: ADVANCED PRACTICE MIDWIFE

## 2022-06-28 NOTE — PROGRESS NOTES
"Subjective:       Marianela Contreras is a 36 y.o. female who presents for a postpartum visit. She is 5 weeks postpartum following a spontaneous vaginal delivery. I have fully reviewed the prenatal and intrapartum course. The delivery was at 41+ gestational weeks. Outcome: spontaneous vaginal delivery. Anesthesia: none. Postpartum course has been uneventful. Baby's course has been WNL. Baby is feeding by breast. Bleeding no bleeding. Bowel function is normal. Bladder function is normal. Patient is not sexually active. Contraception method is none. Postpartum depression screening: negative.    The following portions of the patient's history were reviewed and updated as appropriate: allergies, current medications, past family history, past medical history, past social history, past surgical history and problem list.    Review of Systems  A comprehensive review of systems was negative.     Objective:      /72   Ht 5' 7.32" (1.71 m)   Wt 62.9 kg (138 lb 10.7 oz)   LMP 08/06/2021   Breastfeeding Yes   BMI 21.51 kg/m²    General:  alert, appears stated age and cooperative    Breasts:   not done lactating   Lungs: clear to auscultation bilaterally   Heart:  regular rate and rhythm, S1, S2 normal, no murmur, click, rub or gallop   Abdomen: normal findings: soft, non-tender    Vulva:  normal   Vagina: normal vagina   Cervix:  no cervical motion tenderness  Pap collected   Corpus: normal   Adnexa:  no mass, fullness, tenderness   Rectal Exam: Not performed.          Assessment:       5 week  postpartum exam. Pap smear done at today's visit.     Plan:      1. Contraception: none  2. Repeat PAP collected and sent for + HPV  3. Follow up in: 1 year or as needed.     "

## 2022-10-20 NOTE — PROGRESS NOTES
Chief Complaint   Patient presents with    Routine Prenatal Visit       36 y.o. female  at 39w1d, by Estimated Date of Delivery: 22    Complaints today: none. Doing well today.  Reviewed TW lbs    ROS  OBSTETRICS:   Contractions: Nothing regular   Bleeding No   Loss of fluid No   Fetal mvmnt present  GASTRO:   Nausea No   Vomiting No      OB History    Para Term  AB Living   1             SAB IAB Ectopic Multiple Live Births                  # Outcome Date GA Lbr Samm/2nd Weight Sex Delivery Anes PTL Lv   1 Current                Dating reviewed  Allergies and problem list reviewed and updated  Medical and surgical history reviewed  Prenatal labs reviewed and updated    PHYSICAL EXAM  LMP 2021     GENERAL: No acute distress  HEENT: Normocephalic, atraumatic  NEURO: Alert and oriented x3  PSYCH: Normal mood and affect  PULMONARY: Non-labored respiration; no tachypnea  ABD: Soft, gravid, nontender.      ASSESSMENT AND PLAN    OB Problems (from 10/21/21 to present)     Problem Noted Resolved    Positive GBS test 2022 by Elizabeth Dean CNM No    Overview Signed 2022  8:28 AM by Elizabeth Dean CNM     Treat in labor           GBS (group B Streptococcus carrier), +RV culture, currently pregnant 2022 by Cindy Real CNM No    Overview Signed 2022  1:50 PM by Cindy Real CNM     ( )Prophylaxis in labor           Birth Center Risk Assessment: 0- Meets birth center guidelines 4/15/2022 by Cindy Real CNM No    Overview Signed 4/15/2022  5:52 PM by Cindy Real CNM     Birth Center Risk Assessment: 0- Meets birth center guidelines    0- CNM management in [facility]  1- CNM management on L&D  2- Consultation with OB to develop  plan of care  3- Collaborative CNM/OB management with delivery on L&D  4- Permanent referral of care to [facility] vaginitis during pregnancy, antepartum 4/15/2022 by Cindy Real CNM No     Problem: Falls - Risk of  Goal: *Absence of Falls  Description: Document Lola Jang Fall Risk and appropriate interventions in the flowsheet.   10/20/2022 0158 by Molly Fam RN  Outcome: Progressing Towards Goal  Note: Fall Risk Interventions:  Mobility Interventions: Communicate number of staff needed for ambulation/transfer         Medication Interventions: Evaluate medications/consider consulting pharmacy    Elimination Interventions: Patient to call for help with toileting needs           10/20/2022 0157 by Molly Fam RN  Outcome: Progressing Towards Goal  Note: Fall Risk Interventions:  Mobility Interventions: Communicate number of staff needed for ambulation/transfer         Medication Interventions: Evaluate medications/consider consulting pharmacy    Elimination Interventions: Patient to call for help with toileting needs              Problem: Patient Education: Go to Patient Education Activity  Goal: Patient/Family Education  10/20/2022 0158 by Molly Fam RN  Outcome: Progressing Towards Goal  10/20/2022 0157 by Molly Fam RN  Outcome: Progressing Towards Goal     Problem: Patient Education: Go to Patient Education Activity  Goal: Patient/Family Education  10/20/2022 0158 by Molly Fam RN  Outcome: Progressing Towards Goal  10/20/2022 0157 by Molly Fam RN  Outcome: Progressing Towards Goal     Problem: Patient Education: Go to Patient Education Activity  Goal: Patient/Family Education  10/20/2022 0158 by Molly Fam RN  Outcome: Progressing Towards Goal  10/20/2022 0157 by Molly Fam RN  Outcome: Progressing Towards Goal     Problem: Patient Education: Go to Patient Education Activity  Goal: Patient/Family Education  Outcome: Progressing Towards Goal     Problem: Heart Failure: Day 1  Goal: Off Pathway (Use only if patient is Off Pathway)  Outcome: Progressing Towards Goal  Goal: Activity/Safety  Outcome: Progressing Towards Goal  Goal: Consults, if ordered  Outcome: Progressing Towards Goal  Goal: Diagnostic Test/Procedures  Outcome: Progressing Towards Goal  Goal: Nutrition/Diet  Outcome: Progressing Towards Goal  Goal: Discharge Planning  Outcome: Progressing Towards Goal  Goal: Medications  Outcome: Progressing Towards Goal  Goal: Respiratory  Outcome: Progressing Towards Goal  Goal: Treatments/Interventions/Procedures  Outcome: Progressing Towards Goal  Goal: Psychosocial  Outcome: Progressing Towards Goal  Goal: *Oxygen saturation within defined limits  Outcome: Progressing Towards Goal  Goal: *Hemodynamically stable  Outcome: Progressing Towards Goal  Goal: *Optimal pain control at patient's stated goal  Outcome: Progressing Towards Goal  Goal: *Anxiety reduced or absent  Outcome: Progressing Towards Goal     Problem: Heart Failure: Day 2  Goal: Off Pathway (Use only if patient is Off Pathway)  Outcome: Progressing Towards Goal  Goal: Activity/Safety  Outcome: Progressing Towards Goal  Goal: Consults, if ordered  Outcome: Progressing Towards Goal  Goal: Diagnostic Test/Procedures  Outcome: Progressing Towards Goal  Goal: Nutrition/Diet  Outcome: Progressing Towards Goal  Goal: Discharge Planning  Outcome: Progressing Towards Goal  Goal: Medications  Outcome: Progressing Towards Goal  Goal: Respiratory  Outcome: Progressing Towards Goal  Goal: Treatments/Interventions/Procedures  Outcome: Progressing Towards Goal  Goal: Psychosocial  Outcome: Progressing Towards Goal  Goal: *Oxygen saturation within defined limits  Outcome: Progressing Towards Goal  Goal: *Hemodynamically stable  Outcome: Progressing Towards Goal  Goal: *Optimal pain control at patient's stated goal  Outcome: Progressing Towards Goal  Goal: *Anxiety reduced or absent  Outcome: Progressing Towards Goal  Goal: *Demonstrates progressive activity  Outcome: Progressing Towards Goal     Problem: Heart Failure: Day 3  Goal: Off Pathway (Use only if patient is Off Pathway)  Outcome: Progressing Towards Goal  Goal: Overview Signed 4/15/2022  6:14 PM by Cindy Real CNM     In September 2021. Was treated and is asymptomatic as of April 2022.   (pos 4/21 )Test of cure  LORENZO collected via urine 5/5/22            Pregnancy 3/31/2022 by Mina Rodriguez CNM No    Overview Addendum 5/5/2022  8:29 AM by Elizabeth Dean CNM     Prepregnancy BMI: 22 ([facility] max wt: 53lb)  Pap: 09/2021 NILM with + HPV, repeat PP   Dating - By 11wk   U/S - complete, no detected abnormalities   Aneuploidy screening - declined  Blood type: O POS. Rhogam: not indicated  GDM screen - passed  Vaccines - [ ]Flu [declined ]TDAP  GBS-positive  [ ]Consents-  Contraception -  Peds- discussed and information for Dr. DELVIS Mejia given   Circ - n/a it's a girl  COVID vax-  2 doses!              Previous Version    Primigravida of advanced maternal age in third trimester 3/31/2022 by Anna Powell CNM No    Overview Addendum 4/15/2022  5:52 PM by Cindy Real CNM     [x]34w growth ultrasound WNL           Previous Version    Anemia affecting pregnancy in third trimester 3/31/2022 by Anna Powell CNM No    Overview Signed 3/31/2022 10:28 AM by Anna Powell CNM     3/31/22 - Discussed 28w labs and recommend iron supplement.   [ ]Repeat CBC at 36w                 Delivery consents already signed  Discussed plans for support people during labor - patient plans to have Isaiah.  Urine Trich ordered and sent today--Isaiah treated ? (discussed last visit and he verbalized understanding)  Reviewed warning signs, normal FM, and how/when to call.      Follow-up: 1 week, call or present sooner PRN  Elizabeth Dean CNM, MSN  05/05/2022  8:30 AM         Activity/Safety  Outcome: Progressing Towards Goal  Goal: Diagnostic Test/Procedures  Outcome: Progressing Towards Goal  Goal: Nutrition/Diet  Outcome: Progressing Towards Goal  Goal: Discharge Planning  Outcome: Progressing Towards Goal  Goal: Medications  Outcome: Progressing Towards Goal  Goal: Respiratory  Outcome: Progressing Towards Goal  Goal: Treatments/Interventions/Procedures  Outcome: Progressing Towards Goal  Goal: Psychosocial  Outcome: Progressing Towards Goal  Goal: *Oxygen saturation within defined limits  Outcome: Progressing Towards Goal  Goal: *Hemodynamically stable  Outcome: Progressing Towards Goal  Goal: *Optimal pain control at patient's stated goal  Outcome: Progressing Towards Goal  Goal: *Anxiety reduced or absent  Outcome: Progressing Towards Goal  Goal: *Demonstrates progressive activity  Outcome: Progressing Towards Goal     Problem: Heart Failure: Day 4  Goal: Off Pathway (Use only if patient is Off Pathway)  Outcome: Progressing Towards Goal  Goal: Activity/Safety  Outcome: Progressing Towards Goal  Goal: Diagnostic Test/Procedures  Outcome: Progressing Towards Goal  Goal: Nutrition/Diet  Outcome: Progressing Towards Goal  Goal: Discharge Planning  Outcome: Progressing Towards Goal  Goal: Medications  Outcome: Progressing Towards Goal  Goal: Respiratory  Outcome: Progressing Towards Goal  Goal: Treatments/Interventions/Procedures  Outcome: Progressing Towards Goal  Goal: Psychosocial  Outcome: Progressing Towards Goal  Goal: *Oxygen saturation within defined limits  Outcome: Progressing Towards Goal  Goal: *Hemodynamically stable  Outcome: Progressing Towards Goal  Goal: *Optimal pain control at patient's stated goal  Outcome: Progressing Towards Goal  Goal: *Anxiety reduced or absent  Outcome: Progressing Towards Goal  Goal: *Demonstrates progressive activity  Outcome: Progressing Towards Goal     Problem: Heart Failure: Day 5  Goal: Off Pathway (Use only if patient is Off Pathway)  Outcome: Progressing Towards Goal  Goal: Activity/Safety  Outcome: Progressing Towards Goal  Goal: Diagnostic Test/Procedures  Outcome: Progressing Towards Goal  Goal: Nutrition/Diet  Outcome: Progressing Towards Goal  Goal: Discharge Planning  Outcome: Progressing Towards Goal  Goal: Medications  Outcome: Progressing Towards Goal  Goal: Respiratory  Outcome: Progressing Towards Goal  Goal: Treatments/Interventions/Procedures  Outcome: Progressing Towards Goal  Goal: Psychosocial  Outcome: Progressing Towards Goal

## 2022-11-01 ENCOUNTER — TELEPHONE (OUTPATIENT)
Dept: OBSTETRICS AND GYNECOLOGY | Facility: CLINIC | Age: 37
End: 2022-11-01
Payer: COMMERCIAL

## 2022-11-02 ENCOUNTER — OFFICE VISIT (OUTPATIENT)
Dept: OBSTETRICS AND GYNECOLOGY | Facility: CLINIC | Age: 37
End: 2022-11-02
Payer: COMMERCIAL

## 2022-11-02 VITALS — WEIGHT: 153 LBS | DIASTOLIC BLOOD PRESSURE: 70 MMHG | SYSTOLIC BLOOD PRESSURE: 110 MMHG | BODY MASS INDEX: 23.73 KG/M2

## 2022-11-02 DIAGNOSIS — Z32.01 POSITIVE PREGNANCY TEST: Primary | ICD-10-CM

## 2022-11-02 PROCEDURE — 99999 PR PBB SHADOW E&M-EST. PATIENT-LVL II: ICD-10-PCS | Mod: PBBFAC,,, | Performed by: ADVANCED PRACTICE MIDWIFE

## 2022-11-02 PROCEDURE — 3078F DIAST BP <80 MM HG: CPT | Mod: CPTII,S$GLB,, | Performed by: ADVANCED PRACTICE MIDWIFE

## 2022-11-02 PROCEDURE — 3078F PR MOST RECENT DIASTOLIC BLOOD PRESSURE < 80 MM HG: ICD-10-PCS | Mod: CPTII,S$GLB,, | Performed by: ADVANCED PRACTICE MIDWIFE

## 2022-11-02 PROCEDURE — 3008F PR BODY MASS INDEX (BMI) DOCUMENTED: ICD-10-PCS | Mod: CPTII,S$GLB,, | Performed by: ADVANCED PRACTICE MIDWIFE

## 2022-11-02 PROCEDURE — 87086 URINE CULTURE/COLONY COUNT: CPT | Performed by: ADVANCED PRACTICE MIDWIFE

## 2022-11-02 PROCEDURE — 99499 NO LOS: ICD-10-PCS | Mod: S$GLB,,, | Performed by: ADVANCED PRACTICE MIDWIFE

## 2022-11-02 PROCEDURE — 1159F MED LIST DOCD IN RCRD: CPT | Mod: CPTII,S$GLB,, | Performed by: ADVANCED PRACTICE MIDWIFE

## 2022-11-02 PROCEDURE — 1159F PR MEDICATION LIST DOCUMENTED IN MEDICAL RECORD: ICD-10-PCS | Mod: CPTII,S$GLB,, | Performed by: ADVANCED PRACTICE MIDWIFE

## 2022-11-02 PROCEDURE — 99999 PR PBB SHADOW E&M-EST. PATIENT-LVL II: CPT | Mod: PBBFAC,,, | Performed by: ADVANCED PRACTICE MIDWIFE

## 2022-11-02 PROCEDURE — 87491 CHLMYD TRACH DNA AMP PROBE: CPT | Performed by: ADVANCED PRACTICE MIDWIFE

## 2022-11-02 PROCEDURE — 3008F BODY MASS INDEX DOCD: CPT | Mod: CPTII,S$GLB,, | Performed by: ADVANCED PRACTICE MIDWIFE

## 2022-11-02 PROCEDURE — 3074F SYST BP LT 130 MM HG: CPT | Mod: CPTII,S$GLB,, | Performed by: ADVANCED PRACTICE MIDWIFE

## 2022-11-02 PROCEDURE — 87591 N.GONORRHOEAE DNA AMP PROB: CPT | Performed by: ADVANCED PRACTICE MIDWIFE

## 2022-11-02 PROCEDURE — 99499 UNLISTED E&M SERVICE: CPT | Mod: S$GLB,,, | Performed by: ADVANCED PRACTICE MIDWIFE

## 2022-11-02 PROCEDURE — 3074F PR MOST RECENT SYSTOLIC BLOOD PRESSURE < 130 MM HG: ICD-10-PCS | Mod: CPTII,S$GLB,, | Performed by: ADVANCED PRACTICE MIDWIFE

## 2022-11-03 DIAGNOSIS — Z36.89 ESTABLISH GESTATIONAL AGE, ULTRASOUND: Primary | ICD-10-CM

## 2022-11-03 LAB
C TRACH DNA SPEC QL NAA+PROBE: NOT DETECTED
N GONORRHOEA DNA SPEC QL NAA+PROBE: NOT DETECTED

## 2022-11-04 LAB — BACTERIA UR CULT: NO GROWTH

## 2022-11-06 NOTE — PROGRESS NOTES
Chief Complaint: Missed Period    HPI: Marianela Contreras is a 37 y.o., , reporting absence of menses with  LMP of 22. UPT in office positive.      Past Medical History:   Diagnosis Date    Anemia affecting pregnancy in third trimester 3/31/2022    3/31/22 - Discussed 28w labs and recommend iron supplement.  [x]Repeat CBC at 36w -- RESOLVED     History reviewed. No pertinent surgical history.  Social History     Tobacco Use    Smoking status: Never    Smokeless tobacco: Never   Substance Use Topics    Alcohol use: Not Currently    Drug use: Never     Family History   Problem Relation Age of Onset    Breast cancer Mother         Dx around age 40    Colon cancer Neg Hx     Ovarian cancer Neg Hx      OB History    Para Term  AB Living   1 1 1     1   SAB IAB Ectopic Multiple Live Births         0 1      # Outcome Date GA Lbr Samm/2nd Weight Sex Delivery Anes PTL Lv   1 Term 22 41w3d  3.57 kg (7 lb 13.9 oz) F Vag-Spont Local, OTHER N NADEGE       /70   Wt 69.4 kg (153 lb)   LMP 2022   BMI 23.73 kg/m²       ROS   Systemic: No fever chills   Gastrointestinal: No diarrhea or constipation   Genitourinary: No dysuria. No Pelvic Pain  Skin: No rash.      Physical Exam:   Vital Signs:° Normal.  General Appearance:° Well developed.  ° Well nourished.  Neurological:° No disorientation was observed.  Psychiatric: Affect: ° Normal.    Assessment/Plan  1. Confirmation of pregnancy--UPT in office positive Ordered dating Ultrasound and  pt desires appt with [facility].  Labs ordered and will notify [facility]

## 2022-11-08 ENCOUNTER — CLINICAL SUPPORT (OUTPATIENT)
Dept: OBSTETRICS AND GYNECOLOGY | Facility: CLINIC | Age: 37
End: 2022-11-08
Payer: COMMERCIAL

## 2022-11-08 DIAGNOSIS — Z71.9 COUNSELED BY NURSE: Primary | ICD-10-CM

## 2022-11-18 ENCOUNTER — TELEPHONE (OUTPATIENT)
Dept: OBSTETRICS AND GYNECOLOGY | Facility: CLINIC | Age: 37
End: 2022-11-18
Payer: COMMERCIAL

## 2022-11-18 ENCOUNTER — HOSPITAL ENCOUNTER (OUTPATIENT)
Dept: PERINATAL CARE | Facility: OTHER | Age: 37
Discharge: HOME | End: 2022-11-18
Attending: OBSTETRICS & GYNECOLOGY
Payer: COMMERCIAL

## 2022-11-18 DIAGNOSIS — Z36.89 ESTABLISH GESTATIONAL AGE, ULTRASOUND: ICD-10-CM

## 2022-11-18 DIAGNOSIS — O02.1 MISSED ABORTION: Primary | ICD-10-CM

## 2022-11-18 PROCEDURE — 76801 OB US < 14 WKS SINGLE FETUS: CPT

## 2022-11-18 PROCEDURE — 76801 US OB/GYN PROCEDURE (VIEWPOINT): ICD-10-PCS | Mod: 26,,, | Performed by: OBSTETRICS & GYNECOLOGY

## 2022-11-18 PROCEDURE — 76801 OB US < 14 WKS SINGLE FETUS: CPT | Mod: 26,,, | Performed by: OBSTETRICS & GYNECOLOGY

## 2022-11-18 NOTE — TELEPHONE ENCOUNTER
Spoke with patient and her partner on the phone about the US findings being diagnostic for a nonviable IUP. Embryo measuring 7w6d (GA by LMP 10w2d) with absent cardiac activity. Patient denies bleeding, cramping, or fever at this time. Discussed r/b/a of management options, including expectant management, medication management, and surgical management. Patient strongly desires expectant management at this time. Will have hcg drawn on Monday and CNM will follow up at that time. Warning signs/precautions provided: patient will go to ED if she experiences bleeding that saturates a pad per hour, severe pain, or fever/chills. Provided after-hours midwife phone number and encouraged to call with any concerns.

## 2022-11-21 ENCOUNTER — LAB VISIT (OUTPATIENT)
Dept: LAB | Facility: OTHER | Age: 37
End: 2022-11-21
Payer: COMMERCIAL

## 2022-11-21 DIAGNOSIS — O02.1 MISSED ABORTION: ICD-10-CM

## 2022-11-21 LAB — HCG INTACT+B SERPL-ACNC: NORMAL MIU/ML

## 2022-11-21 PROCEDURE — 84702 CHORIONIC GONADOTROPIN TEST: CPT

## 2022-11-21 PROCEDURE — 36415 COLL VENOUS BLD VENIPUNCTURE: CPT

## 2022-11-23 ENCOUNTER — PATIENT MESSAGE (OUTPATIENT)
Dept: OBSTETRICS AND GYNECOLOGY | Facility: CLINIC | Age: 37
End: 2022-11-23
Payer: COMMERCIAL

## 2022-11-25 ENCOUNTER — PATIENT MESSAGE (OUTPATIENT)
Dept: OBSTETRICS AND GYNECOLOGY | Facility: CLINIC | Age: 37
End: 2022-11-25
Payer: COMMERCIAL

## 2022-11-25 ENCOUNTER — LAB VISIT (OUTPATIENT)
Dept: LAB | Facility: OTHER | Age: 37
End: 2022-11-25
Payer: COMMERCIAL

## 2022-11-25 DIAGNOSIS — O02.1 MISSED ABORTION: ICD-10-CM

## 2022-11-25 LAB — HCG INTACT+B SERPL-ACNC: NORMAL MIU/ML

## 2022-11-25 PROCEDURE — 84702 CHORIONIC GONADOTROPIN TEST: CPT

## 2022-11-25 PROCEDURE — 36415 COLL VENOUS BLD VENIPUNCTURE: CPT

## 2022-11-28 ENCOUNTER — PATIENT MESSAGE (OUTPATIENT)
Dept: OBSTETRICS AND GYNECOLOGY | Facility: CLINIC | Age: 37
End: 2022-11-28
Payer: COMMERCIAL

## 2022-12-01 ENCOUNTER — INITIAL PRENATAL (OUTPATIENT)
Dept: OBSTETRICS AND GYNECOLOGY | Facility: CLINIC | Age: 37
End: 2022-12-01
Payer: COMMERCIAL

## 2022-12-01 VITALS
WEIGHT: 153.75 LBS | BODY MASS INDEX: 23.85 KG/M2 | SYSTOLIC BLOOD PRESSURE: 114 MMHG | DIASTOLIC BLOOD PRESSURE: 70 MMHG

## 2022-12-01 DIAGNOSIS — O03.9 MISCARRIAGE: Primary | ICD-10-CM

## 2022-12-01 PROCEDURE — 99999 PR PBB SHADOW E&M-EST. PATIENT-LVL II: CPT | Mod: PBBFAC,,,

## 2022-12-01 PROCEDURE — 99999 PR PBB SHADOW E&M-EST. PATIENT-LVL II: ICD-10-PCS | Mod: PBBFAC,,,

## 2022-12-01 PROCEDURE — 99213 PR OFFICE/OUTPT VISIT, EST, LEVL III, 20-29 MIN: ICD-10-PCS | Mod: S$GLB,,,

## 2022-12-01 PROCEDURE — 99213 OFFICE O/P EST LOW 20 MIN: CPT | Mod: S$GLB,,,

## 2022-12-01 NOTE — PROGRESS NOTES
Patient here for scheduled initial OB appointment, but US on 11/18 diagnostic for miscarriage. Patient desired to let miscarriage happen on its own. She has since experienced bleeding -- one day of heavier bleeding, light bleeding since. Doing well overall. Hcg 13,399 on 11/25, down from 23,413 on 11/21. Discussed getting repeat hcg levels drawn until down to 0.    Discussed warning signs, how/when to call. Standing order for hcg level placed.

## 2022-12-05 ENCOUNTER — PATIENT MESSAGE (OUTPATIENT)
Dept: OBSTETRICS AND GYNECOLOGY | Facility: CLINIC | Age: 37
End: 2022-12-05
Payer: COMMERCIAL

## 2022-12-09 ENCOUNTER — LAB VISIT (OUTPATIENT)
Dept: LAB | Facility: OTHER | Age: 37
End: 2022-12-09
Attending: ADVANCED PRACTICE MIDWIFE
Payer: COMMERCIAL

## 2022-12-09 DIAGNOSIS — O02.1 MISSED ABORTION: ICD-10-CM

## 2022-12-09 DIAGNOSIS — Z32.01 POSITIVE PREGNANCY TEST: ICD-10-CM

## 2022-12-09 LAB — HCG INTACT+B SERPL-ACNC: 146 MIU/ML

## 2022-12-09 PROCEDURE — 36415 COLL VENOUS BLD VENIPUNCTURE: CPT

## 2022-12-09 PROCEDURE — 84702 CHORIONIC GONADOTROPIN TEST: CPT

## 2022-12-11 ENCOUNTER — PATIENT MESSAGE (OUTPATIENT)
Dept: OBSTETRICS AND GYNECOLOGY | Facility: CLINIC | Age: 37
End: 2022-12-11
Payer: COMMERCIAL

## 2023-04-04 ENCOUNTER — PATIENT MESSAGE (OUTPATIENT)
Dept: RESEARCH | Facility: HOSPITAL | Age: 38
End: 2023-04-04
Payer: COMMERCIAL

## 2023-11-16 ENCOUNTER — HOSPITAL ENCOUNTER (EMERGENCY)
Facility: OTHER | Age: 38
Discharge: HOME | End: 2023-11-16
Attending: EMERGENCY MEDICINE
Payer: COMMERCIAL

## 2023-11-16 VITALS
TEMPERATURE: 98 F | SYSTOLIC BLOOD PRESSURE: 125 MMHG | BODY MASS INDEX: 24.22 KG/M2 | WEIGHT: 154.31 LBS | RESPIRATION RATE: 20 BRPM | OXYGEN SATURATION: 95 % | HEIGHT: 67 IN | HEART RATE: 64 BPM | DIASTOLIC BLOOD PRESSURE: 79 MMHG

## 2023-11-16 DIAGNOSIS — G47.9 DIFFICULTY SLEEPING: ICD-10-CM

## 2023-11-16 DIAGNOSIS — F41.9 ANXIETY: Primary | ICD-10-CM

## 2023-11-16 PROCEDURE — 99283 EMERGENCY DEPT VISIT LOW MDM: CPT

## 2023-11-16 NOTE — FIRST PROVIDER EVALUATION
" Emergency Department TeleTriage Encounter Note      CHIEF COMPLAINT    Chief Complaint   Patient presents with    Anxiety     Pt reporting anxiety over dogs being taken by [employer] since July and has not been able to sleep. Pt states "I usually am a happy person and I am feeling really down over the situation." Denies SI/HI.        VITAL SIGNS   Initial Vitals [11/16/23 1738]   BP Pulse Resp Temp SpO2   (!) 145/87 83 18 98 °F (36.7 °C) 98 %      MAP       --            ALLERGIES    Review of patient's allergies indicates:  No Known Allergies    PROVIDER TRIAGE NOTE  Patient presents with complaint of anxiety and depression about dogs being taken away by [employer] in July and recently had a judgement that would not be returned. She is tearful.Denies SI/HI      ORDERS  Labs Reviewed - No data to display    ED Orders (720h ago, onward)      None              Virtual Visit Note: The provider triage portion of this emergency department evaluation and documentation was performed via [employer], a HIPAA-compliant telemedicine application, in concert with a tele-presenter in the room. A face to face patient evaluation with one of my colleagues will occur once the patient is placed in an emergency department room.      DISCLAIMER: This note was prepared with [employer] voice recognition transcription software. Garbled syntax, mangled pronouns, and other bizarre constructions may be attributed to that software system.    "

## 2023-11-16 NOTE — ED TRIAGE NOTES
Pt arrived with c/o anxiety and insomnia since her dogs got taken away by [facility] in July.  Pt states her anxiety worsened over the past week after going to court for her dogs.  Pt states she is constantly thinking and worrying about her dogs and their well-being which is causing her to be unable to sleep at night.  Pt denies any hx of anxiety.  Denies taking any OTC sleep aids.  Pt denies any SI/HI.  Pt answering questions appropriately, speaking in complete sentences, respirations even and unlabored.  Aao x 4.

## 2023-11-17 NOTE — DISCHARGE INSTRUCTIONS
You were seen in the ER for evaluation of anxiety.  I believe that you would benefit from establishing with a primary care provider and a psychiatrist.  I have placed a referral to Internal Medicine and Psychiatry and provided you with numbers to call for follow up.  These providers may be able to start medicine to help you with your anxiety, and a psychiatrist can provide therapy that can help with anxiety.  I am attaching instructions to your paperwork with recommendations on good sleep hygiene to help you with your sleep.  In addition, you may take over-the-counter melatonin to aid in sleep.  Please follow up with a primary care provider and psychiatrist, you may return to the ER for any new or worsening symptoms.

## 2023-11-17 NOTE — ED PROVIDER NOTES
"Source of History:  Patient    Chief complaint:  Anxiety (Pt reporting anxiety over dogs being taken by [employer] since July and has not been able to sleep. Pt states "I usually am a happy person and I am feeling really down over the situation." Denies SI/HI. )      HPI:  Marianela Contreras is a 38 y.o. female presenting with anxiety and insomnia. Patient reports that she has 6 pit bull dogs, and in July while she was out of town they got out of her gate and attacked another dog, prompting 4 of them to be taking by [employer]. She states that since this incident, they have been in court trying to get her dogs back. She states that since the incident, she has been dealing with generalized anxiety and this has been causing her to wake frequently during the night. She presents with her partner and daughter. 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 She denies any history of anxiety or depression, describes herself as a normally happy person. She states that this week, she was told by the court that they will not be getting their dogs back, and her anxiety and insomnia have worsened. She reports she is able to fall asleep, but she wakes frequently and finds herself constantly worrying about her dogs. She states she will get up in the middle of the night to research ways to help them. She states when she does sleep, she does not wake feeling rested. She is requesting medication to help with sleep. She denies any SI/HI/AVH. She denies any physical complaints today.    This is the extent to the patients complaints today here in the emergency department.    PMH:  As per HPI and below:  Past Medical History:   Diagnosis Date    Anemia affecting pregnancy in third trimester 3/31/2022    3/31/22 - Discussed 28w labs and recommend iron supplement.  [x]Repeat CBC at 36w -- RESOLVED     History reviewed. No pertinent surgical history.    Social History     Tobacco Use    Smoking status: Never    Smokeless tobacco: Never   Substance Use Topics    " "Alcohol use: Not Currently    Drug use: Never       Review of patient's allergies indicates:  No Known Allergies    ROS: As per HPI and below:  Constitutional: No fever.  No chills.  Eyes: No visual changes.  ENT: No sore throat. No ear pain.  Cardiovascular: No chest pain.  Respiratory: No shortness of breath.  GI: No abdominal pain.  No nausea or vomiting.  Genitourinary: No abnormal urination.  Neurologic: No headache. No focal weakness.  No numbness.  MSK: no back pain.    Physical Exam:    /79 (BP Location: Left arm, Patient Position: Sitting)   Pulse 64   Temp 98 °F (36.7 °C) (Oral)   Resp 20   Ht 5' 7.32" (1.71 m)   Wt 70 kg (154 lb 5.2 oz)   SpO2 95%   BMI 23.94 kg/m²   Vitals:    11/16/23 1738 11/16/23 1938   BP: (!) 145/87 125/79   Pulse: 83 64   Resp: 18 20   Temp: 98 °F (36.7 °C) 98 °F (36.7 °C)   TempSrc: Oral Oral   SpO2: 98% 95%   Weight: 70 kg (154 lb 5.2 oz)    Height: 5' 7.32" (1.71 m)        Nursing note and vital signs reviewed.  Constitutional: No acute distress.  Well-appearing, non-toxic. Appears anxious. Eating sandwich in bed with partner and daughter present.  Eyes: No conjunctival injection.  Extraocular muscles are intact.  ENT: Mucous membranes moist.  Cardiovascular:  Regular rate and rhythm no murmurs gallops or rubs.  Chest/ Respiratory:  Clear to auscultation bilaterally without wheezing rhonchi or rales.   Abdomen: No distension present.  Musculoskeletal: Good range of motion all joints.  No deformities.  Neck supple.   Skin: No rashes seen.  No abrasions.  No ecchymoses.  Neuro: alert and oriented x3,  no focal neurological deficits.  Psych: Appropriate, conversant. Describes mood as anxious and stressed, affect congruent. Tearful at times during exam. No SI/HI/AVH. Does not appear to be responding to internal stimuli. Speech normal rate. Thought process linear.       Labs that have been ordered have been independently reviewed and interpreted by myself.    Labs " Reviewed - No data to display    No orders to display       No results found for this or any previous visit.    Differential Diagnosis:  Initial differential include anxiety, depression, PTSD, insomnia, adjustment disorder, acute stress disorder    MDM  38 y.o. female with no significant past medical history presenting for evaluation of anxiety x 4 months worsening x 1 week. Secondary to traumatic event of having dogs taken away by [employer]. Patient is overall well appearing, does appear anxious and stressed. No SI/HI/AVH, patient with rational thought process and is not gravely disabled. No indication for PEC. Had a discussion with patient about options for management and the need to follow up with a PCP. Patient currently does not have a PCP or a psychiatrist. Has no mental health history. I discussed with patient that prescribing her a sleep medication is not the best solution, that I believe there are steps that should be taken to address the cause of the insomnia. As the insomnia is related to anxiety/stress, believe patient needs outpatient psychiatry evaluation for therapy and possibly medication management of anxiety. Discussed with patient that she will need an outpatient provider to discuss these medications with, do not believe appropriate to start anxiety medications from the ER. In the meantime, discussed sleep hygiene with patient, recommended melatonin OTC to aid in sleep. Patient was uncertain about beginning therapy secondary to time constraints, but was advised on the benefits of therapy and PCP follow up. She was very receptive to ideas of sleep hygiene and was provided with handout with sleep hygiene recommendations. She verbalized understanding of discharge plan and was in agreement. No indication for emergent workup. Patient provided with outpatient resources and referrals to establish with psychiatry and PCP. I discussed this case with my attending, Dr. Stevens, who was in agreement with  plan.         Diagnostic Impression:    1. Anxiety    2. Difficulty sleeping         ED Disposition Condition    Discharge Stable            ED Prescriptions    None       Follow-up Information       Follow up With Specialties Details Why Contact Info Additional Information    [facility] Psych[facility] Schedule an appointment as soon as possible for a visit in 2 days  [address], [address]  [facility] [address] [address]    [facility] Internal Medicine Internal Medicine Schedule an appointment as soon as possible for a visit in 2 days  [address] [address] [phone] Internal Medicine - [facility], 8th Floor, [address] Please park in [place] and use [place] elevators    [facility]  Emergency Dept Emergency Medicine Go to  If symptoms worsen [address] [facility] [address] [phone]              Patricia Arevalo PA-C  11/17/23 6765

## 2023-11-27 NOTE — PROGRESS NOTES
Office visit  Patient: Marianela Contreras   11/30/2023     Assessment:     1. Annual physical exam    2. Anxiety    3. Difficulty sleeping      Plan:       1. Annual physical exam  -     TSH; Future; Expected date: 11/30/2023  -     Lipid Panel; Future; Expected date: 11/30/2023  -     Comprehensive Metabolic Panel; Future; Expected date: 11/30/2023  -     CBC Auto Differential; Future; Expected date: 11/30/2023  -     HEMOGLOBIN A1C; Future; Expected date: 11/30/2023  -discussed healthy habits and recommended preventative screenings  -declined flu and tetanus vaccines today    2. Anxiety  -     Ambulatory referral/consult to Internal Medicine  -     Ambulatory referral/consult to Psychology; Future; Expected date: 12/07/2023  -     traZODone (DESYREL) 50 MG tablet; Take 1 tablet (50 mg total) by mouth every evening.  Dispense: 30 tablet; Refill: 2  -discussed sleep hygiene; patient hesitant to start long-term medication, but we will readdress trying an SSRI at a future visit if anxiety persists    3. Difficulty sleeping  -     trazodone p.r.n. for sleep  -patient also counseled to try melatonin over-the-counter  -discussed sleep hygiene and avoiding electronics before or while in bed        CHIEF COMPLAINT:  Anxiety    HPI: Marianela Contreras is a 38 y.o. female who presents for anxiety. She reports that her dogs have been taken away from her and they are in appeals court to get the dogs back. She reports that this is causing her severe anxiety. She can't sleep, continues to think about the dogs all the time. Her ISAAC-7 score is 18.    She reports that continuously thinking keeps her up at night.  She has a hard time falling asleep. She also has interruption of her sleeping with her baby and thoughts about her dog. She gets about 4 hours of sleep a night.  She states that when she can't fall asleep she scrolls on her phone.    She reports that she is not interested in medication.  She does not even like to take medication  "for a headache.    No symptoms of a panic attack.    Review of Systems   Constitutional:  Negative for chills, fever and malaise/fatigue.   HENT:  Negative for congestion, ear pain and sore throat.    Eyes:  Negative for pain and discharge.   Respiratory:  Negative for cough and shortness of breath.    Cardiovascular:  Negative for chest pain and palpitations.   Gastrointestinal:  Negative for abdominal pain, constipation, diarrhea, nausea and vomiting.   Genitourinary:  Negative for dysuria, frequency and hematuria.   Musculoskeletal:  Negative for joint pain and myalgias.   Skin:  Negative for rash.   Neurological:  Negative for dizziness, seizures and headaches.         Current Outpatient Medications   Medication Instructions    traZODone (DESYREL) 50 mg, Oral, Nightly       No results found for: "HGBA1C"  No results found for: "MICALBCREAT"  No results found for: "LDLCALC", "CHOL", "HDL", "TRIG"    Lab Results   Component Value Date    WBC 15.93 (H) 05/21/2022    HGB 10.8 (L) 05/21/2022    HGB 13.0 05/20/2022    HCT 32.3 (L) 05/21/2022    MCV 86 05/21/2022     05/21/2022    HEPCAB Negative 09/23/2021       No results found for: "LH", "FSH", "TOTALTESTOST", "PROGESTERONE", "ESTRADIOL", "EKFXUORE66JC", "WPASQDPK63", "FERRITIN", "IRON", "TRANSFERRIN", "TIBC", "FESATURATED", "ZINC"      Past Medical History:   Diagnosis Date    Anemia affecting pregnancy in third trimester 3/31/2022    3/31/22 - Discussed 28w labs and recommend iron supplement.  [x]Repeat CBC at 36w -- RESOLVED     No past surgical history on file.  Vitals:    11/30/23 1416   BP: 127/76   Pulse: 61   SpO2: 98%   Weight: 72.4 kg (159 lb 9.8 oz)   Height: 5' 7" (1.702 m)   PainSc: 0-No pain     Objective:   Physical Exam  Vitals reviewed.   Constitutional:       General: She is not in acute distress.     Appearance: She is well-developed.   HENT:      Head: Normocephalic and atraumatic.      Right Ear: External ear normal.      Left Ear: " External ear normal.      Mouth/Throat:      Mouth: Mucous membranes are moist.   Eyes:      Conjunctiva/sclera: Conjunctivae normal.   Neck:      Thyroid: No thyromegaly.   Cardiovascular:      Rate and Rhythm: Normal rate and regular rhythm.      Heart sounds: Normal heart sounds. No murmur heard.     No friction rub. No gallop.   Pulmonary:      Effort: Pulmonary effort is normal. No respiratory distress.      Breath sounds: Normal breath sounds. No wheezing, rhonchi or rales.   Musculoskeletal:      Right lower leg: No edema.      Left lower leg: No edema.   Lymphadenopathy:      Cervical: No cervical adenopathy.   Skin:     General: Skin is warm and dry.   Neurological:      Mental Status: She is alert and oriented to person, place, and time.   Psychiatric:         Mood and Affect: Mood normal.         Behavior: Behavior normal.             Florence Keene MD  Internal Medicine and Pediatrics

## 2023-11-29 ENCOUNTER — OFFICE VISIT (OUTPATIENT)
Dept: OBSTETRICS AND GYNECOLOGY | Facility: CLINIC | Age: 38
End: 2023-11-29
Payer: COMMERCIAL

## 2023-11-29 VITALS
SYSTOLIC BLOOD PRESSURE: 131 MMHG | WEIGHT: 157.44 LBS | DIASTOLIC BLOOD PRESSURE: 80 MMHG | BODY MASS INDEX: 24.42 KG/M2

## 2023-11-29 DIAGNOSIS — Z12.4 PAP SMEAR FOR CERVICAL CANCER SCREENING: ICD-10-CM

## 2023-11-29 DIAGNOSIS — N93.0 POSTCOITAL BLEEDING: Primary | ICD-10-CM

## 2023-11-29 DIAGNOSIS — Z78.9 ATTEMPTING TO CONCEIVE: ICD-10-CM

## 2023-11-29 PROCEDURE — 87491 CHLMYD TRACH DNA AMP PROBE: CPT | Performed by: NURSE PRACTITIONER

## 2023-11-29 PROCEDURE — 1159F MED LIST DOCD IN RCRD: CPT | Mod: CPTII,S$GLB,, | Performed by: NURSE PRACTITIONER

## 2023-11-29 PROCEDURE — 88141 PR  CYTOPATH CERV/VAG INTERPRET: ICD-10-PCS | Mod: ,,, | Performed by: PATHOLOGY

## 2023-11-29 PROCEDURE — 87624 HPV HI-RISK TYP POOLED RSLT: CPT | Performed by: NURSE PRACTITIONER

## 2023-11-29 PROCEDURE — 3075F PR MOST RECENT SYSTOLIC BLOOD PRESS GE 130-139MM HG: ICD-10-PCS | Mod: CPTII,S$GLB,, | Performed by: NURSE PRACTITIONER

## 2023-11-29 PROCEDURE — 1159F PR MEDICATION LIST DOCUMENTED IN MEDICAL RECORD: ICD-10-PCS | Mod: CPTII,S$GLB,, | Performed by: NURSE PRACTITIONER

## 2023-11-29 PROCEDURE — 99999 PR PBB SHADOW E&M-EST. PATIENT-LVL II: CPT | Mod: PBBFAC,,, | Performed by: NURSE PRACTITIONER

## 2023-11-29 PROCEDURE — 3008F PR BODY MASS INDEX (BMI) DOCUMENTED: ICD-10-PCS | Mod: CPTII,S$GLB,, | Performed by: NURSE PRACTITIONER

## 2023-11-29 PROCEDURE — 3075F SYST BP GE 130 - 139MM HG: CPT | Mod: CPTII,S$GLB,, | Performed by: NURSE PRACTITIONER

## 2023-11-29 PROCEDURE — 99213 OFFICE O/P EST LOW 20 MIN: CPT | Mod: S$GLB,,, | Performed by: NURSE PRACTITIONER

## 2023-11-29 PROCEDURE — 3079F PR MOST RECENT DIASTOLIC BLOOD PRESSURE 80-89 MM HG: ICD-10-PCS | Mod: CPTII,S$GLB,, | Performed by: NURSE PRACTITIONER

## 2023-11-29 PROCEDURE — 88141 CYTOPATH C/V INTERPRET: CPT | Mod: ,,, | Performed by: PATHOLOGY

## 2023-11-29 PROCEDURE — 99213 PR OFFICE/OUTPT VISIT, EST, LEVL III, 20-29 MIN: ICD-10-PCS | Mod: S$GLB,,, | Performed by: NURSE PRACTITIONER

## 2023-11-29 PROCEDURE — 3008F BODY MASS INDEX DOCD: CPT | Mod: CPTII,S$GLB,, | Performed by: NURSE PRACTITIONER

## 2023-11-29 PROCEDURE — 3079F DIAST BP 80-89 MM HG: CPT | Mod: CPTII,S$GLB,, | Performed by: NURSE PRACTITIONER

## 2023-11-29 PROCEDURE — 99999 PR PBB SHADOW E&M-EST. PATIENT-LVL II: ICD-10-PCS | Mod: PBBFAC,,, | Performed by: NURSE PRACTITIONER

## 2023-11-29 PROCEDURE — 88175 CYTOPATH C/V AUTO FLUID REDO: CPT | Performed by: PATHOLOGY

## 2023-11-29 PROCEDURE — 87591 N.GONORRHOEAE DNA AMP PROB: CPT | Performed by: NURSE PRACTITIONER

## 2023-11-29 NOTE — PROGRESS NOTES
Marianela Contreras is a 38 y.o. female  presents with complaint of intermittent thin, watery vaginal discharge. No odor. No itching. Trying to conceive. Here with her partner. Reports some vaginal bleeding after intercourse twice. Most recent episode was last night. Went to the bathroom and wiped, then bleeding resolved. Desires std swabs. History of hpv. She is tracking her ovulation, questioning implantation bleeding?        Past Medical History:   Diagnosis Date    Anemia affecting pregnancy in third trimester 3/31/2022    3/31/22 - Discussed 28w labs and recommend iron supplement.  [x]Repeat CBC at 36w -- RESOLVED     History reviewed. No pertinent surgical history.  Social History     Tobacco Use    Smoking status: Never    Smokeless tobacco: Never   Substance Use Topics    Alcohol use: Not Currently    Drug use: Never     Family History   Problem Relation Age of Onset    Breast cancer Mother         Dx around age 40    Colon cancer Neg Hx     Ovarian cancer Neg Hx      OB History    Para Term  AB Living   2 1 1   1 1   SAB IAB Ectopic Multiple Live Births   1     0 1      # Outcome Date GA Lbr Samm/2nd Weight Sex Delivery Anes PTL Lv   2 SAB 22     SAB      1 Term 22 41w3d  3.57 kg (7 lb 13.9 oz) F Vag-Spont Local, OTHER N NADEGE       ROS:  GENERAL: No fever, chills, fatigability or weight loss.  VULVAR: No pain, no lesions and no itching.  VAGINAL: No relaxation, no itching, + discharge, + abnormal bleeding and no lesions.  ABDOMEN: No abdominal pain. Denies nausea. Denies vomiting. No diarrhea. No constipation  BREAST: Denies pain. No lumps. No discharge.  URINARY: No incontinence, no nocturia, no frequency and no dysuria.  CARDIOVASCULAR: No chest pain. No shortness of breath. No leg cramps.  NEUROLOGICAL: No headaches. No vision changes.    PHYSICAL EXAM:  VULVA: normal appearing vulva with no masses, tenderness or lesions   VAGINA: normal appearing vagina with normal color. NO  discharge, no lesions   CERVIX: normal appearing cervix without discharge or lesions friable  UTERUS: uterus is normal size, shape, consistency and nontender   ADNEXA: normal adnexa in size, nontender and no masses    ASSESSMENT and PLAN:    ICD-10-CM ICD-9-CM    1. Postcoital bleeding  N93.0 626.7 HPV High Risk Genotypes, PCR      Liquid-Based Pap Smear, Screening      C. trachomatis/N. gonorrhoeae by AMP DNA      2. Pap smear for cervical cancer screening  Z12.4 V76.2 HPV High Risk Genotypes, PCR      Liquid-Based Pap Smear, Screening      3. Attempting to conceive  Z78.9 V49.89           Pap updated  GC pending  Discussed possible causes for PCB- The most serious cause of postcoital bleeding is cervical cancer, but the risk in women with postcoital bleeding is low. Cervical ectropion, polyps, and cervicitis (especially chlamydia) are other possible causes of postcoital bleeding. Ectropion is common in adolescents. After adolescence, it may be observed in women who are pregnant, taking estrogen-progestin contraceptives, or who had a cervical laceration during labor and delivery.  If infection is ruled out general inflammation may be the cause- avoid latex condoms, use water based lubricants, avoid scented tampons. A 7 day course of antibiotics may be prescribed if ongoing.      FOLLOW UP: PRN lack of improvement.    As of April 1, 2021, the Cures Act has been passed nationally. This new law requires that all doctors progress notes, lab results, pathology reports and radiology reports be released IMMEDIATELY to the patient in the patient portal. That means that the results are released to you at the EXACT same time they are released to me. Therefore, with all of the patients that I have I am not able to reply to each patient exactly when the results come in. So there will be a delay from when you see the results to when I see them and have time to come up with a response to send you. Also I only see these results  when I am on the computer at work. So if the results come in over the weekend or after 5 pm of a work day, I will not see them until the next business day. As you can tell, this is a challenge as a provider to give every patient the quick response they hope for and deserve. So please be patient!   Thanks for your understanding and patience.

## 2023-11-30 ENCOUNTER — OFFICE VISIT (OUTPATIENT)
Dept: PRIMARY CARE CLINIC | Facility: CLINIC | Age: 38
End: 2023-11-30
Payer: COMMERCIAL

## 2023-11-30 VITALS
SYSTOLIC BLOOD PRESSURE: 127 MMHG | HEIGHT: 67 IN | HEART RATE: 61 BPM | OXYGEN SATURATION: 98 % | WEIGHT: 159.63 LBS | DIASTOLIC BLOOD PRESSURE: 76 MMHG | BODY MASS INDEX: 25.06 KG/M2

## 2023-11-30 DIAGNOSIS — Z00.00 ANNUAL PHYSICAL EXAM: Primary | ICD-10-CM

## 2023-11-30 DIAGNOSIS — F41.9 ANXIETY: ICD-10-CM

## 2023-11-30 DIAGNOSIS — G47.9 DIFFICULTY SLEEPING: ICD-10-CM

## 2023-11-30 LAB
C TRACH DNA SPEC QL NAA+PROBE: NOT DETECTED
N GONORRHOEA DNA SPEC QL NAA+PROBE: NOT DETECTED

## 2023-11-30 PROCEDURE — 3078F DIAST BP <80 MM HG: CPT | Mod: CPTII,S$GLB,, | Performed by: STUDENT IN AN ORGANIZED HEALTH CARE EDUCATION/TRAINING PROGRAM

## 2023-11-30 PROCEDURE — 99395 PR PREVENTIVE VISIT,EST,18-39: ICD-10-PCS | Mod: S$GLB,,, | Performed by: STUDENT IN AN ORGANIZED HEALTH CARE EDUCATION/TRAINING PROGRAM

## 2023-11-30 PROCEDURE — 99999 PR PBB SHADOW E&M-EST. PATIENT-LVL V: CPT | Mod: PBBFAC,,, | Performed by: STUDENT IN AN ORGANIZED HEALTH CARE EDUCATION/TRAINING PROGRAM

## 2023-11-30 PROCEDURE — 3078F PR MOST RECENT DIASTOLIC BLOOD PRESSURE < 80 MM HG: ICD-10-PCS | Mod: CPTII,S$GLB,, | Performed by: STUDENT IN AN ORGANIZED HEALTH CARE EDUCATION/TRAINING PROGRAM

## 2023-11-30 PROCEDURE — 1160F PR REVIEW ALL MEDS BY PRESCRIBER/CLIN PHARMACIST DOCUMENTED: ICD-10-PCS | Mod: CPTII,S$GLB,, | Performed by: STUDENT IN AN ORGANIZED HEALTH CARE EDUCATION/TRAINING PROGRAM

## 2023-11-30 PROCEDURE — 99999 PR PBB SHADOW E&M-EST. PATIENT-LVL V: ICD-10-PCS | Mod: PBBFAC,,, | Performed by: STUDENT IN AN ORGANIZED HEALTH CARE EDUCATION/TRAINING PROGRAM

## 2023-11-30 PROCEDURE — 1159F PR MEDICATION LIST DOCUMENTED IN MEDICAL RECORD: ICD-10-PCS | Mod: CPTII,S$GLB,, | Performed by: STUDENT IN AN ORGANIZED HEALTH CARE EDUCATION/TRAINING PROGRAM

## 2023-11-30 PROCEDURE — 1160F RVW MEDS BY RX/DR IN RCRD: CPT | Mod: CPTII,S$GLB,, | Performed by: STUDENT IN AN ORGANIZED HEALTH CARE EDUCATION/TRAINING PROGRAM

## 2023-11-30 PROCEDURE — 3008F BODY MASS INDEX DOCD: CPT | Mod: CPTII,S$GLB,, | Performed by: STUDENT IN AN ORGANIZED HEALTH CARE EDUCATION/TRAINING PROGRAM

## 2023-11-30 PROCEDURE — 3074F SYST BP LT 130 MM HG: CPT | Mod: CPTII,S$GLB,, | Performed by: STUDENT IN AN ORGANIZED HEALTH CARE EDUCATION/TRAINING PROGRAM

## 2023-11-30 PROCEDURE — 3074F PR MOST RECENT SYSTOLIC BLOOD PRESSURE < 130 MM HG: ICD-10-PCS | Mod: CPTII,S$GLB,, | Performed by: STUDENT IN AN ORGANIZED HEALTH CARE EDUCATION/TRAINING PROGRAM

## 2023-11-30 PROCEDURE — 1159F MED LIST DOCD IN RCRD: CPT | Mod: CPTII,S$GLB,, | Performed by: STUDENT IN AN ORGANIZED HEALTH CARE EDUCATION/TRAINING PROGRAM

## 2023-11-30 PROCEDURE — 3008F PR BODY MASS INDEX (BMI) DOCUMENTED: ICD-10-PCS | Mod: CPTII,S$GLB,, | Performed by: STUDENT IN AN ORGANIZED HEALTH CARE EDUCATION/TRAINING PROGRAM

## 2023-11-30 PROCEDURE — 99395 PREV VISIT EST AGE 18-39: CPT | Mod: S$GLB,,, | Performed by: STUDENT IN AN ORGANIZED HEALTH CARE EDUCATION/TRAINING PROGRAM

## 2023-11-30 RX ORDER — TRAZODONE HYDROCHLORIDE 50 MG/1
50 TABLET ORAL NIGHTLY
Qty: 30 TABLET | Refills: 2 | Status: SHIPPED | OUTPATIENT
Start: 2023-11-30 | End: 2024-02-28

## 2023-11-30 NOTE — PATIENT INSTRUCTIONS
Try melatonin from over the counter.  If that doesn't help, you can start taking half of the trazodone 50 mg at night.

## 2023-12-05 LAB
HPV HR 12 DNA SPEC QL NAA+PROBE: NEGATIVE
HPV16 AG SPEC QL: NEGATIVE
HPV18 DNA SPEC QL NAA+PROBE: NEGATIVE

## 2023-12-08 LAB
FINAL PATHOLOGIC DIAGNOSIS: ABNORMAL
Lab: ABNORMAL

## 2024-05-31 ENCOUNTER — CLINICAL SUPPORT (OUTPATIENT)
Dept: OTHER | Facility: CLINIC | Age: 39
End: 2024-05-31
Payer: COMMERCIAL

## 2024-05-31 DIAGNOSIS — Z00.8 ENCOUNTER FOR OTHER GENERAL EXAMINATION: ICD-10-CM

## 2024-05-31 PROCEDURE — 80061 LIPID PANEL: CPT | Mod: QW,S$GLB,, | Performed by: INTERNAL MEDICINE

## 2024-05-31 PROCEDURE — 99401 PREV MED CNSL INDIV APPRX 15: CPT | Mod: S$GLB,,, | Performed by: INTERNAL MEDICINE

## 2024-05-31 PROCEDURE — 82947 ASSAY GLUCOSE BLOOD QUANT: CPT | Mod: QW,S$GLB,, | Performed by: INTERNAL MEDICINE

## 2024-06-01 VITALS
WEIGHT: 147 LBS | SYSTOLIC BLOOD PRESSURE: 128 MMHG | BODY MASS INDEX: 23.07 KG/M2 | HEIGHT: 67 IN | DIASTOLIC BLOOD PRESSURE: 86 MMHG

## 2024-06-01 LAB
GLUCOSE SERPL-MCNC: 101 MG/DL (ref 60–140)
HDLC SERPL-MCNC: 71 MG/DL
POC CHOLESTEROL, LDL (DOCK): 88 MG/DL
POC CHOLESTEROL, TOTAL: 172 MG/DL
TRIGL SERPL-MCNC: 70 MG/DL

## 2024-07-05 ENCOUNTER — PATIENT MESSAGE (OUTPATIENT)
Dept: OBSTETRICS AND GYNECOLOGY | Facility: CLINIC | Age: 39
End: 2024-07-05
Payer: COMMERCIAL

## 2024-07-05 DIAGNOSIS — Z78.9 ATTEMPTING TO CONCEIVE: Primary | ICD-10-CM

## 2024-08-29 ENCOUNTER — OFFICE VISIT (OUTPATIENT)
Dept: OBSTETRICS AND GYNECOLOGY | Facility: CLINIC | Age: 39
End: 2024-08-29
Payer: COMMERCIAL

## 2024-08-29 ENCOUNTER — LAB VISIT (OUTPATIENT)
Dept: LAB | Facility: OTHER | Age: 39
End: 2024-08-29
Attending: ADVANCED PRACTICE MIDWIFE
Payer: COMMERCIAL

## 2024-08-29 VITALS
SYSTOLIC BLOOD PRESSURE: 118 MMHG | HEIGHT: 67 IN | WEIGHT: 148.94 LBS | BODY MASS INDEX: 23.38 KG/M2 | HEART RATE: 90 BPM | DIASTOLIC BLOOD PRESSURE: 66 MMHG

## 2024-08-29 DIAGNOSIS — R30.0 DYSURIA: Primary | ICD-10-CM

## 2024-08-29 DIAGNOSIS — Z87.42 HISTORY OF ABNORMAL CERVICAL PAP SMEAR: ICD-10-CM

## 2024-08-29 DIAGNOSIS — Z91.89 AT HIGH RISK FOR BREAST CANCER: ICD-10-CM

## 2024-08-29 DIAGNOSIS — Z11.3 SCREENING EXAMINATION FOR STI: ICD-10-CM

## 2024-08-29 DIAGNOSIS — R39.15 URGENCY OF URINATION: ICD-10-CM

## 2024-08-29 DIAGNOSIS — Z01.419 ROUTINE GYNECOLOGICAL EXAMINATION: ICD-10-CM

## 2024-08-29 LAB
B-HCG UR QL: NEGATIVE
CTP QC/QA: YES
HAV IGM SERPL QL IA: NORMAL
HBV CORE IGM SERPL QL IA: NORMAL
HBV SURFACE AG SERPL QL IA: NORMAL
HCV AB SERPL QL IA: NEGATIVE
HIV 1+2 AB+HIV1 P24 AG SERPL QL IA: NEGATIVE
TREPONEMA PALLIDUM IGG+IGM AB [PRESENCE] IN SERUM OR PLASMA BY IMMUNOASSAY: NONREACTIVE

## 2024-08-29 PROCEDURE — 86593 SYPHILIS TEST NON-TREP QUANT: CPT | Performed by: ADVANCED PRACTICE MIDWIFE

## 2024-08-29 PROCEDURE — 87591 N.GONORRHOEAE DNA AMP PROB: CPT | Performed by: ADVANCED PRACTICE MIDWIFE

## 2024-08-29 PROCEDURE — 81514 NFCT DS BV&VAGINITIS DNA ALG: CPT | Performed by: ADVANCED PRACTICE MIDWIFE

## 2024-08-29 PROCEDURE — 87086 URINE CULTURE/COLONY COUNT: CPT | Performed by: ADVANCED PRACTICE MIDWIFE

## 2024-08-29 PROCEDURE — 87624 HPV HI-RISK TYP POOLED RSLT: CPT | Performed by: ADVANCED PRACTICE MIDWIFE

## 2024-08-29 PROCEDURE — 87491 CHLMYD TRACH DNA AMP PROBE: CPT | Performed by: ADVANCED PRACTICE MIDWIFE

## 2024-08-29 PROCEDURE — 80074 ACUTE HEPATITIS PANEL: CPT | Performed by: ADVANCED PRACTICE MIDWIFE

## 2024-08-29 PROCEDURE — 36415 COLL VENOUS BLD VENIPUNCTURE: CPT | Performed by: ADVANCED PRACTICE MIDWIFE

## 2024-08-29 PROCEDURE — 87389 HIV-1 AG W/HIV-1&-2 AB AG IA: CPT | Performed by: ADVANCED PRACTICE MIDWIFE

## 2024-08-29 PROCEDURE — 99999 PR PBB SHADOW E&M-EST. PATIENT-LVL III: CPT | Mod: PBBFAC,,, | Performed by: ADVANCED PRACTICE MIDWIFE

## 2024-08-29 PROCEDURE — 87088 URINE BACTERIA CULTURE: CPT | Performed by: ADVANCED PRACTICE MIDWIFE

## 2024-08-29 PROCEDURE — 88175 CYTOPATH C/V AUTO FLUID REDO: CPT | Performed by: ADVANCED PRACTICE MIDWIFE

## 2024-08-29 NOTE — LETTER
August 29, 2024      [facility]  [address]  [facility] [place] [facility] [address]  Phone: [phone]  Fax: [phone]       Patient: Marianela Contreras   YOB: 1985  Date of Visit: 08/29/2024    To Whom It May Concern:    Deuce Contreras  was at [facility] on 08/29/2024 @ 11;00AM.  The patient may return to work/school on 08/29/2024 with no restrictions. If you have any questions or concerns, or if I can be of further assistance, please do not hesitate to contact me.    Sincerely,    Sadie Parson MA

## 2024-08-29 NOTE — PROGRESS NOTES
"CC: Well woman exam & possible UTI    Marianela Contreras is a 39 y.o. female  presents for well woman exam.  Patient's last menstrual period was 2024 (exact date).      Complains of burning with urination and urgency x 3 weeks. She does not complain of vaginal discharge.  She is sexually active.  Denies pelvic pain, no abnormal discharge.  They are trying to get pregnant. Pt desires STI screening today.     Last pap: 2023 - ASCUS, HR HPV Negative.(HR HPV + in )    Past Medical History:   Diagnosis Date    Anemia affecting pregnancy in third trimester 3/31/2022    3/31/22 - Discussed 28w labs and recommend iron supplement.  [x]Repeat CBC at 36w -- RESOLVED     History reviewed. No pertinent surgical history.  Social History     Socioeconomic History    Marital status: Single   Tobacco Use    Smoking status: Never    Smokeless tobacco: Never   Substance and Sexual Activity    Alcohol use: Not Currently    Drug use: Never    Sexual activity: Yes     Partners: Male     Birth control/protection: None     Comment: Isaiah   Social History Narrative    She is a  at [employer].      Family History   Problem Relation Name Age of Onset    Breast cancer Mother          Dx around age 40    Colon cancer Neg Hx      Ovarian cancer Neg Hx      Heart attack Neg Hx      Stroke Neg Hx       OB History          2    Para   1    Term   1            AB   1    Living   1         SAB   1    IAB        Ectopic        Multiple   0    Live Births   1                 /66   Pulse 90   Ht 5' 7" (1.702 m)   Wt 67.5 kg (148 lb 14.7 oz)   LMP 2024 (Exact Date)   Breastfeeding No   BMI 23.32 kg/m²       ROS:  GENERAL: Denies weight gain or weight loss. Feeling well overall.   SKIN: Denies rash or lesions.   HEAD: Denies head injury or headache.   NODES: Denies enlarged lymph nodes.   CHEST: Denies chest pain or shortness of breath.   CARDIOVASCULAR: Denies " palpitations or left sided chest pain.   ABDOMEN: No abdominal pain, constipation, diarrhea, nausea, vomiting or rectal bleeding.   URINARY: No frequency, dysuria, hematuria, or burning on urination.  REPRODUCTIVE: See HPI.   BREASTS: The patient performs breast self-examination and denies pain, lumps, or nipple discharge.   HEMATOLOGIC: No easy bruisability or excessive bleeding.   MUSCULOSKELETAL: Denies joint pain or swelling.   NEUROLOGIC: Denies syncope or weakness.   PSYCHIATRIC: Denies depression, anxiety or mood swings.    PHYSICAL EXAM:  APPEARANCE: Well nourished, well developed, in no acute distress.  AFFECT: Alert and oriented x 3  SKIN: Warm, dry, & intact. No acne or hirsutism.  NECK: Neck symmetric without masses or thyromegaly  NODES: No cervical, axillary, epitrochlear, inguinal, or femoral lymph node enlargement  CHEST: Good respiratory effect.  ABDOMEN: Soft.  No tenderness or masses.  No hepatosplenomegaly.  No hernias.  BREASTS: Symmetrical, breast tissue firm and elastic, areola pattern equal bilaterally, no visible lesions.  No nipple discharge bilaterally.  NO palpable masses bilaterally.  PELVIC: Normal external genitalia without lesions.  Normal hair distribution.  Adequate perineal body, normal urethral meatus.  Vagina moist and well rugated without lesions or discharge.  Cervix pink, without lesions, discharge or tenderness.  No significant cystocele or rectocele.    Bimanual exam shows uterus to be normal size, regular, mobile and nontender.  Adnexa without masses or tenderness.    EXTREMITIES: No edema.    ASSESSMENT/PLAN:  Dysuria  -     POCT Urine Pregnancy  -     Urine culture  -     Vaginosis Screen by DNA Probe    Routine gynecological examination  -     Liquid-Based Pap Smear, Screening  -     HPV High Risk Genotypes, PCR    Urgency of urination  -     Urine culture    Screening examination for STI  -     C. trachomatis/N. gonorrhoeae by AMP DNA  -     HIV-1 and HIV-2 antibodies;  Future; Expected date: 08/29/2024  -     Treponema Pallidium Antibodies IgG, IgM; Future; Expected date: 08/29/2024  -     Hepatitis panel, acute; Future; Expected date: 08/29/2024    At high risk for breast cancer  -     Mammo Digital Screening Bilat w/ Eris; Future; Expected date: 08/29/2024  -     Offered genetic counseling - pt will consider.    History of abnormal cervical Pap smear  -     Liquid-Based Pap Smear, Screening  -     HPV High Risk Genotypes, PCR      Patient was counseled today on A.C.S. Pap guidelines and recommendations for yearly pelvic exams, mammograms and monthly self breast exams; to see her PCP for other health maintenance.     Follow up in about 1 year (around 8/29/2025).    I spent a total of 30 minutes on the day of the visit. This includes face to face time and non-face to face time preparing to see the patient (eg, review of tests), Obtaining and/or reviewing separately obtained history, Documenting clinical information in the electronic or other health record, Independently interpreting resultsand communicating results to the patient/family/caregiver, or Care coordination.     Anna Powell, ROBM, MSN

## 2024-08-30 DIAGNOSIS — B96.89 BACTERIAL VAGINOSIS: Primary | ICD-10-CM

## 2024-08-30 DIAGNOSIS — N76.0 BACTERIAL VAGINOSIS: Primary | ICD-10-CM

## 2024-08-30 LAB
BACTERIAL VAGINOSIS DNA: POSITIVE
C TRACH DNA SPEC QL NAA+PROBE: NOT DETECTED
CANDIDA GLABRATA DNA: NEGATIVE
CANDIDA KRUSEI DNA: NEGATIVE
CANDIDA RRNA VAG QL PROBE: NEGATIVE
CLINICAL INFO: NORMAL
DATE OF PREVIOUS PAP: NORMAL
DATE PREVIOUS BX: NO
LMP START DATE: NORMAL
N GONORRHOEA DNA SPEC QL NAA+PROBE: NOT DETECTED
SPECIMEN SOURCE CVX/VAG CYTO: NORMAL
T VAGINALIS RRNA GENITAL QL PROBE: NEGATIVE

## 2024-08-30 RX ORDER — METRONIDAZOLE 500 MG/1
500 TABLET ORAL 2 TIMES DAILY
Qty: 14 TABLET | Refills: 0 | Status: SHIPPED | OUTPATIENT
Start: 2024-08-30 | End: 2024-09-06

## 2024-08-31 LAB — BACTERIA UR CULT: ABNORMAL

## 2024-09-05 PROBLEM — O02.1 MISSED ABORTION: Status: RESOLVED | Noted: 2022-11-18 | Resolved: 2024-09-05

## 2024-09-05 PROBLEM — Z53.8 PAP SMEAR OF CERVIX NOT NEEDED: Status: ACTIVE | Noted: 2024-09-05

## 2024-10-30 ENCOUNTER — HOSPITAL ENCOUNTER (OUTPATIENT)
Dept: RADIOLOGY | Facility: OTHER | Age: 39
Discharge: HOME | End: 2024-10-30
Attending: ADVANCED PRACTICE MIDWIFE
Payer: COMMERCIAL

## 2024-10-30 DIAGNOSIS — Z12.31 ENCOUNTER FOR SCREENING MAMMOGRAM FOR HIGH-RISK PATIENT: ICD-10-CM

## 2024-10-30 DIAGNOSIS — Z91.89 AT HIGH RISK FOR BREAST CANCER: ICD-10-CM

## 2024-10-30 PROCEDURE — 77067 SCR MAMMO BI INCL CAD: CPT | Mod: TC

## 2024-10-30 PROCEDURE — 77063 BREAST TOMOSYNTHESIS BI: CPT | Mod: TC

## 2024-11-29 ENCOUNTER — HOSPITAL ENCOUNTER (OUTPATIENT)
Dept: RADIOLOGY | Facility: OTHER | Age: 39
Discharge: HOME | End: 2024-11-29
Attending: ADVANCED PRACTICE MIDWIFE
Payer: COMMERCIAL

## 2024-11-29 DIAGNOSIS — R92.8 ABNORMAL MAMMOGRAM: ICD-10-CM

## 2024-11-29 PROCEDURE — 77061 BREAST TOMOSYNTHESIS UNI: CPT | Mod: 26,RT,, | Performed by: RADIOLOGY

## 2024-11-29 PROCEDURE — 77061 BREAST TOMOSYNTHESIS UNI: CPT | Mod: TC,RT

## 2024-11-29 PROCEDURE — 77065 DX MAMMO INCL CAD UNI: CPT | Mod: 26,RT,, | Performed by: RADIOLOGY

## 2024-12-02 ENCOUNTER — PATIENT MESSAGE (OUTPATIENT)
Dept: RADIOLOGY | Facility: OTHER | Age: 39
End: 2024-12-02
Payer: COMMERCIAL

## 2024-12-02 ENCOUNTER — TELEPHONE (OUTPATIENT)
Dept: RADIOLOGY | Facility: OTHER | Age: 39
End: 2024-12-02
Payer: COMMERCIAL

## 2024-12-02 DIAGNOSIS — R92.8 ABNORMAL MAMMOGRAM OF RIGHT BREAST: Primary | ICD-10-CM

## 2024-12-02 NOTE — PROGRESS NOTES
Called and discussed mammogram results with pt. (BI-RADS 4 - suspicious calcifications to right breast). Tyrer-Cuzick risk assessment model is 29.16%   Pt reports her understanding from radiologist that it is likely benign, but was marked at high risk because of her history. She is considering waiting 6mo for further imaging/follow up.  Encouraged pt that she should meet with Breast Specialist to discuss these risks/benefits more in depth to make this decision, and would recommend close follow up secondary to family history/age/Tyrer-Cuzick. She is agreeable to this. All questions answered    Consult order placed

## 2024-12-02 NOTE — TELEPHONE ENCOUNTER
Pt has decided she does not want the recommended breast biopsy at this time and would like to schedule a 6 month f/u instead. Instructed pt she will need right diagnostic mammogram orders from her provider and she should schedule the appointment asap as she will not receive a reminder due to the current recommendation being biopsy. Pt verbalized understanding. Provided my direct number for questions and to assist with scheduling once orders are entered.

## 2024-12-04 ENCOUNTER — HOSPITAL ENCOUNTER (EMERGENCY)
Facility: OTHER | Age: 39
Discharge: HOME | End: 2024-12-05
Payer: COMMERCIAL
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
## 2024-12-04 DIAGNOSIS — S09.93XA FACIAL TRAUMA: ICD-10-CM

## 2024-12-04 DIAGNOSIS — R51.9 FACIAL PAIN: ICD-10-CM

## 2024-12-04 DIAGNOSIS — S00.31XA ABRASION OF NOSE, INITIAL ENCOUNTER: ICD-10-CM

## 2024-12-04 LAB
B-HCG UR QL: POSITIVE
CTP QC/QA: YES

## 2024-12-04 PROCEDURE — 81025 URINE PREGNANCY TEST: CPT | Performed by: EMERGENCY MEDICINE

## 2024-12-04 PROCEDURE — 99282 EMERGENCY DEPT VISIT SF MDM: CPT

## 2024-12-05 VITALS
WEIGHT: 143.31 LBS | TEMPERATURE: 98 F | BODY MASS INDEX: 22.49 KG/M2 | OXYGEN SATURATION: 100 % | SYSTOLIC BLOOD PRESSURE: 130 MMHG | HEIGHT: 67 IN | RESPIRATION RATE: 16 BRPM | HEART RATE: 80 BPM | DIASTOLIC BLOOD PRESSURE: 77 MMHG
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
## 2024-12-06 ENCOUNTER — TELEPHONE (OUTPATIENT)
Dept: RADIOLOGY | Facility: OTHER | Age: 39
End: 2024-12-06
Payer: COMMERCIAL

## 2024-12-06 ENCOUNTER — PATIENT MESSAGE (OUTPATIENT)
Dept: RADIOLOGY | Facility: OTHER | Age: 39
End: 2024-12-06
Payer: COMMERCIAL

## 2024-12-06 NOTE — TELEPHONE ENCOUNTER
Called pt to f/u on plans for breast biopsy vs 6 month f/u. No answer, left message to return call.

## 2024-12-11 ENCOUNTER — TELEPHONE (OUTPATIENT)
Dept: RADIOLOGY | Facility: OTHER | Age: 39
End: 2024-12-11
Payer: COMMERCIAL

## 2024-12-11 NOTE — TELEPHONE ENCOUNTER
Pt called back after scheduling breast biopsy stating she has a positive at home pregnancy test. Instructed pt she will be advised of the next steps regarding mammogram guided breast biopsy as xray is used for this procedure.

## 2024-12-11 NOTE — TELEPHONE ENCOUNTER
Pt scheduled breast biopsy for 12/20/24 at [facility]. Appointment instructions and location reviewed, pt verbalized understanding.

## 2024-12-17 ENCOUNTER — TELEPHONE (OUTPATIENT)
Dept: OBSTETRICS AND GYNECOLOGY | Facility: HOSPITAL | Age: 39
End: 2024-12-17
Payer: COMMERCIAL

## 2024-12-18 ENCOUNTER — NURSE TRIAGE (OUTPATIENT)
Dept: ADMINISTRATIVE | Facility: CLINIC | Age: 39
End: 2024-12-18
Payer: COMMERCIAL

## 2024-12-18 ENCOUNTER — PATIENT MESSAGE (OUTPATIENT)
Dept: OBSTETRICS AND GYNECOLOGY | Facility: CLINIC | Age: 39
End: 2024-12-18
Payer: COMMERCIAL

## 2024-12-18 NOTE — TELEPHONE ENCOUNTER
Spoke to the PT and she had concerns about the MMG with pregnant women. I suggested pt to contact the Physician group that will be doing the procedure and notify them of her pregnancy. Phone number given.

## 2024-12-18 NOTE — TELEPHONE ENCOUNTER
----- Message from Med Assistant Noel sent at 12/16/2024  4:36 PM CST -----    ----- Message -----  From: Uday Vyas  Sent: 12/16/2024   4:12 PM CST  To: Andre Castillo Staff    Type: General Call Back     Name of Caller:Pt  Symptoms:Mammo  Would the patient rather a call back or a response via [employer]? Call back  Best Call Back Number:[phone]   Additional Information: Pt would like to know if the mammo will be ok to take since she just had her baby. Please reach out with further.

## 2024-12-18 NOTE — TELEPHONE ENCOUNTER
Patient has a biopsy scheduled for Friday. Patient has recently found out she is pregnant and is asking if she should proceed with the biopsy. Will route message to ordering provider. Patient denies any symptoms in need of triage at this time. Instructed to call back with additional questions or worsening of symptoms. Patient verbalized understanding.     Reason for Disposition   Question about upcoming scheduled surgery, procedure or test, no triage required, and triager able to answer question    Protocols used: Information Only Call - No Triage-A-[place]

## 2024-12-19 ENCOUNTER — TELEPHONE (OUTPATIENT)
Dept: OBSTETRICS AND GYNECOLOGY | Facility: CLINIC | Age: 39
End: 2024-12-19
Payer: COMMERCIAL

## 2024-12-19 NOTE — TELEPHONE ENCOUNTER
Contacted patient regarding call on yesterday per midwife Mandy wants patient to be seen in clinic 12/19/2024. Patient stated she can not come in on this day or any other day this week. She is preparing for a trip to [place] and will be back in [facility] 12/29/2024.    Janny Carrasco MA

## 2024-12-20 ENCOUNTER — HOSPITAL ENCOUNTER (OUTPATIENT)
Dept: RADIOLOGY | Facility: OTHER | Age: 39
Discharge: HOME | End: 2024-12-20
Attending: ADVANCED PRACTICE MIDWIFE
Payer: COMMERCIAL

## 2024-12-20 DIAGNOSIS — R92.8 ABNORMAL MAMMOGRAM: ICD-10-CM

## 2024-12-20 PROCEDURE — A4648 IMPLANTABLE TISSUE MARKER: HCPCS

## 2024-12-20 PROCEDURE — 88305 TISSUE EXAM BY PATHOLOGIST: CPT | Performed by: STUDENT IN AN ORGANIZED HEALTH CARE EDUCATION/TRAINING PROGRAM

## 2024-12-20 PROCEDURE — 19081 BX BREAST 1ST LESION STRTCTC: CPT | Mod: RT,,, | Performed by: RADIOLOGY

## 2024-12-20 PROCEDURE — 63600175 PHARM REV CODE 636 W HCPCS: Performed by: ADVANCED PRACTICE MIDWIFE

## 2024-12-20 RX ORDER — LIDOCAINE HYDROCHLORIDE 20 MG/ML
5 INJECTION, SOLUTION INFILTRATION; PERINEURAL ONCE
Status: DISCONTINUED | OUTPATIENT
Start: 2024-12-20 | End: 2024-12-21 | Stop reason: HOSPADM

## 2024-12-20 RX ORDER — LIDOCAINE HYDROCHLORIDE 10 MG/ML
15 INJECTION, SOLUTION INFILTRATION; PERINEURAL ONCE
Status: COMPLETED | OUTPATIENT
Start: 2024-12-20 | End: 2024-12-20

## 2024-12-20 RX ORDER — LIDOCAINE HYDROCHLORIDE 20 MG/ML
10 INJECTION, SOLUTION INFILTRATION; PERINEURAL ONCE
Status: COMPLETED | OUTPATIENT
Start: 2024-12-20 | End: 2024-12-20

## 2024-12-20 RX ADMIN — LIDOCAINE HYDROCHLORIDE 5 ML: 20 INJECTION, SOLUTION INFILTRATION; PERINEURAL at 10:12

## 2024-12-20 RX ADMIN — LIDOCAINE HYDROCHLORIDE 15 ML: 10 INJECTION, SOLUTION EPIDURAL; INFILTRATION; INTRACAUDAL at 10:12

## 2024-12-26 ENCOUNTER — TELEPHONE (OUTPATIENT)
Dept: RADIOLOGY | Facility: OTHER | Age: 39
End: 2024-12-26
Payer: COMMERCIAL

## 2024-12-26 LAB
FINAL PATHOLOGIC DIAGNOSIS: NORMAL
GROSS: NORMAL
Lab: NORMAL

## 2024-12-26 NOTE — TELEPHONE ENCOUNTER
Patient notified of right breast biopsy results per pathology reported on 12/26/24. Diagnosis: BENIGN BREAST PARENCHYMA. Explained to patient results are negative for breast cancer. Instructed on need for short interval follow up in 6 months. Questions answered. Patient verbalized understanding. Biopsy site WNL. Encouraged to call [phone] for further questions or concerns.

## 2024-12-27 ENCOUNTER — PATIENT MESSAGE (OUTPATIENT)
Dept: OBSTETRICS AND GYNECOLOGY | Facility: CLINIC | Age: 39
End: 2024-12-27
Payer: COMMERCIAL

## 2024-12-27 ENCOUNTER — CLINICAL SUPPORT (OUTPATIENT)
Dept: OBSTETRICS AND GYNECOLOGY | Facility: CLINIC | Age: 39
End: 2024-12-27
Payer: COMMERCIAL

## 2024-12-27 DIAGNOSIS — N91.2 AMENORRHEA: Primary | ICD-10-CM

## 2024-12-27 PROCEDURE — 99999 PR PBB SHADOW E&M-EST. PATIENT-LVL II: CPT | Mod: PBBFAC,,,

## 2025-01-09 ENCOUNTER — TELEPHONE (OUTPATIENT)
Dept: OBSTETRICS AND GYNECOLOGY | Facility: HOSPITAL | Age: 40
End: 2025-01-09
Payer: COMMERCIAL

## 2025-01-09 ENCOUNTER — NURSE TRIAGE (OUTPATIENT)
Dept: ADMINISTRATIVE | Facility: CLINIC | Age: 40
End: 2025-01-09
Payer: COMMERCIAL

## 2025-01-09 NOTE — TELEPHONE ENCOUNTER
----- Message from Blanca sent at 1/9/2025 10:49 AM CST -----  Regarding: missed call       Who Called: Marianela         Who Left Message for Patient: Sravanthi         Does the patient know what this is regarding? No         Best Call Back Number:[phone]         Additional Information:

## 2025-01-09 NOTE — TELEPHONE ENCOUNTER
Pt calling with c/o vaginal spotting pt said that she just started today and is only 8 weeks and hasn't seen MD yet. Pt said that she has had a miscarriage in the past and triaged and care advice is home care. Pt told to call back if any other question or concerns or worsening.  GYN appt with Midwife                       Reason for Disposition   SPOTTING (single or brief episode)    Additional Information   Negative: Shock suspected (e.g., cold/pale/clammy skin, too weak to stand, low BP, rapid pulse)   Negative: Difficult to awaken or acting confused (e.g., disoriented, slurred speech)   Negative: Passed out (e.g., fainted, lost consciousness, blacked out and was not responding)   Negative: Sounds like a life-threatening emergency to the triager   Negative: SEVERE vaginal bleeding (e.g., soaking 2 pads or tampons per hour and present 2 or more hours; 1 menstrual cup every 2 hours)   Negative: SEVERE abdominal pain (e.g., excruciating)   Negative: SEVERE dizziness (e.g., unable to stand, requires support to walk, feels like passing out)   Negative: Passed tissue (e.g., gray-white)   Negative: Shoulder pain   Negative: Constant abdominal pain lasting > 2 hours   Negative: Fever 100.4 F (38.0 C) or higher   Negative: Pale skin (pallor) of new-onset or getting worse   Negative: Patient sounds very sick or weak to the triager   Negative: MODERATE vaginal bleeding (e.g., soaking 1 pad or tampon per hour and present > 6 hours; 1 menstrual cup every 6 hours)   Negative: MILD vaginal bleeding (i.e., less than 1 pad / hour; less than patient's usual menstrual bleeding; not just spotting) and pregnant > 12 weeks   Negative: Intermittent lower abdominal pain (e.g., cramping) lasting > 24 hours   Negative: Pain or burning with passing urine (urination)   Negative: Using heparin (e.g., Lovenox) or other strong blood thinner, or known bleeding disorder (e.g., thrombocytopenia)   Negative: Patient wants to be seen   Negative: Has  IUD   Negative: MILD vaginal bleeding (e.g., less than 1 pad / hour; less than patient's usual menstrual bleeding; not just spotting)   Negative: Prior history of 'ectopic pregnancy' or previous tubal surgery (e.g., tubal ligation)   Negative: SPOTTING lasting > 48 hours or spotting happens more than once in a week   Negative: Unusual vaginal discharge (e.g., bad smelling, yellow, green, or foamy-white)   Negative: Not feeling pregnant any longer (e.g., breast tenderness or nausea has disappeared)    Protocols used: Pregnancy - Vaginal Bleeding Less Than 20 Weeks EGA-A-OH

## 2025-01-09 NOTE — TELEPHONE ENCOUNTER
Pt reports light pink bleeding noticed this morning. Denies pain. Denies ongoing bleeding.    Instructed to report to ED if bleeding increases or if any abdominal pain is present. Pt verbalized understanding.

## 2025-01-11 ENCOUNTER — TELEPHONE (OUTPATIENT)
Dept: OBSTETRICS AND GYNECOLOGY | Facility: CLINIC | Age: 40
End: 2025-01-11
Payer: COMMERCIAL

## 2025-01-11 NOTE — TELEPHONE ENCOUNTER
"Spoke to patient on the phone. She experienced some spotting on 1/9, but now it has increased and has become more like "the first day" of her period. Reports mild cramping. Denies heavy bleeding, severe pain, or fever. Patient tearful on the phone.    Discussed that while not confirmed, it is a probable miscarriage. Warning signs provided, including when to go to the ED (severe bleeding, lightheadedness, fever, severe pain). Condolences expressed. Patient encouraged to reach out to friends and family at this time for emotional support. Discussed that we can try to fit her in next week (has appointment scheduled for 1/23). Discussed that patient can contact us at any time with questions, concerns, or for support.   "

## 2025-01-13 ENCOUNTER — PATIENT MESSAGE (OUTPATIENT)
Dept: OBSTETRICS AND GYNECOLOGY | Facility: CLINIC | Age: 40
End: 2025-01-13
Payer: COMMERCIAL

## 2025-01-14 ENCOUNTER — PROCEDURE VISIT (OUTPATIENT)
Dept: MATERNAL FETAL MEDICINE | Facility: CLINIC | Age: 40
End: 2025-01-14
Payer: COMMERCIAL

## 2025-01-14 ENCOUNTER — OFFICE VISIT (OUTPATIENT)
Dept: OBSTETRICS AND GYNECOLOGY | Facility: CLINIC | Age: 40
End: 2025-01-14
Payer: COMMERCIAL

## 2025-01-14 ENCOUNTER — PATIENT MESSAGE (OUTPATIENT)
Dept: OBSTETRICS AND GYNECOLOGY | Facility: CLINIC | Age: 40
End: 2025-01-14

## 2025-01-14 ENCOUNTER — LAB VISIT (OUTPATIENT)
Dept: LAB | Facility: OTHER | Age: 40
End: 2025-01-14
Payer: COMMERCIAL

## 2025-01-14 VITALS
HEART RATE: 73 BPM | SYSTOLIC BLOOD PRESSURE: 118 MMHG | DIASTOLIC BLOOD PRESSURE: 63 MMHG | HEIGHT: 67 IN | BODY MASS INDEX: 21.8 KG/M2 | WEIGHT: 138.88 LBS

## 2025-01-14 DIAGNOSIS — N91.2 AMENORRHEA: Primary | ICD-10-CM

## 2025-01-14 DIAGNOSIS — Z32.01 POSITIVE URINE PREGNANCY TEST: ICD-10-CM

## 2025-01-14 DIAGNOSIS — Z32.01 POSITIVE URINE PREGNANCY TEST: Primary | ICD-10-CM

## 2025-01-14 DIAGNOSIS — N93.9 VAGINAL BLEEDING: ICD-10-CM

## 2025-01-14 DIAGNOSIS — N91.2 AMENORRHEA: ICD-10-CM

## 2025-01-14 LAB
B-HCG UR QL: POSITIVE
BASOPHILS # BLD AUTO: 0.05 K/UL (ref 0–0.2)
BASOPHILS NFR BLD: 0.9 % (ref 0–1.9)
CTP QC/QA: YES
DIFFERENTIAL METHOD BLD: ABNORMAL
EOSINOPHIL # BLD AUTO: 0.1 K/UL (ref 0–0.5)
EOSINOPHIL NFR BLD: 1.9 % (ref 0–8)
ERYTHROCYTE [DISTWIDTH] IN BLOOD BY AUTOMATED COUNT: 14 % (ref 11.5–14.5)
HCG INTACT+B SERPL-ACNC: 643 MIU/ML
HCT VFR BLD AUTO: 29.2 % (ref 37–48.5)
HGB BLD-MCNC: 9.2 G/DL (ref 12–16)
IMM GRANULOCYTES # BLD AUTO: 0.01 K/UL (ref 0–0.04)
IMM GRANULOCYTES NFR BLD AUTO: 0.2 % (ref 0–0.5)
LYMPHOCYTES # BLD AUTO: 2.4 K/UL (ref 1–4.8)
LYMPHOCYTES NFR BLD: 40.5 % (ref 18–48)
MCH RBC QN AUTO: 26.7 PG (ref 27–31)
MCHC RBC AUTO-ENTMCNC: 31.5 G/DL (ref 32–36)
MCV RBC AUTO: 85 FL (ref 82–98)
MONOCYTES # BLD AUTO: 0.6 K/UL (ref 0.3–1)
MONOCYTES NFR BLD: 10.2 % (ref 4–15)
NEUTROPHILS # BLD AUTO: 2.7 K/UL (ref 1.8–7.7)
NEUTROPHILS NFR BLD: 46.3 % (ref 38–73)
NRBC BLD-RTO: 0 /100 WBC
PLATELET # BLD AUTO: 313 K/UL (ref 150–450)
PMV BLD AUTO: 9.2 FL (ref 9.2–12.9)
RBC # BLD AUTO: 3.45 M/UL (ref 4–5.4)
WBC # BLD AUTO: 5.8 K/UL (ref 3.9–12.7)

## 2025-01-14 PROCEDURE — 76817 TRANSVAGINAL US OBSTETRIC: CPT | Mod: S$GLB,,, | Performed by: OBSTETRICS & GYNECOLOGY

## 2025-01-14 PROCEDURE — 99999 PR PBB SHADOW E&M-EST. PATIENT-LVL III: CPT | Mod: PBBFAC,,,

## 2025-01-14 PROCEDURE — 36415 COLL VENOUS BLD VENIPUNCTURE: CPT

## 2025-01-14 PROCEDURE — 99213 OFFICE O/P EST LOW 20 MIN: CPT | Mod: S$GLB,,,

## 2025-01-14 PROCEDURE — 3074F SYST BP LT 130 MM HG: CPT | Mod: CPTII,S$GLB,,

## 2025-01-14 PROCEDURE — 85025 COMPLETE CBC W/AUTO DIFF WBC: CPT

## 2025-01-14 PROCEDURE — 81025 URINE PREGNANCY TEST: CPT | Mod: S$GLB,,,

## 2025-01-14 PROCEDURE — 1159F MED LIST DOCD IN RCRD: CPT | Mod: CPTII,S$GLB,,

## 2025-01-14 PROCEDURE — 3008F BODY MASS INDEX DOCD: CPT | Mod: CPTII,S$GLB,,

## 2025-01-14 PROCEDURE — 84702 CHORIONIC GONADOTROPIN TEST: CPT

## 2025-01-14 PROCEDURE — 3078F DIAST BP <80 MM HG: CPT | Mod: CPTII,S$GLB,,

## 2025-01-14 NOTE — PROGRESS NOTES
"Possible AB    C/C: Marianela Contreras presents for evaluation of possible AB    HPI: Patient's last menstrual period was 11/3/2024 (exact date). Gestational age approx: 10w2d. Reports heavy bleeding over the last few days, including some clots. Not enough to fill pad hourly. Still bleeding. Some episodes of mild dizziness upon exertion.        PMH:   Past Medical History:   Diagnosis Date    Anemia affecting pregnancy in third trimester 2022    3/31/22 - Discussed 28w labs and recommend iron supplement.  [x]Repeat CBC at 36w -- RESOLVED    Chlamydia     Trauma 24     PSH:   History reviewed. No pertinent surgical history.  Meds: N/A   Allergies:  Review of patient's allergies indicates:  No Known Allergies  FamH:   Family History   Problem Relation Name Age of Onset    Breast cancer Mother Svitlana Reyes         Dx around age 40    Colon cancer Neg Hx      Ovarian cancer Neg Hx      Heart attack Neg Hx      Stroke Neg Hx      Cervical cancer Neg Hx      Uterine cancer Neg Hx       GynH: Denies history of abnormal pap smear/STD. Last pap smear done 24, normal.   OBH:   OB History    Para Term  AB Living   3 1 1   1 1   SAB IAB Ectopic Multiple Live Births   1     0 1      # Outcome Date GA Lbr Samm/2nd Weight Sex Type Anes PTL Lv   3 Current            2 SAB 22     SAB      1 Term 22 41w3d  3.57 kg (7 lb 13.9 oz) F Vag-Spont Local, OTHER N NADEGE     SocH: Planned pregnancy. Denies tobacco, alcohol, and illicit drug use.     ROS:    General: Denies fevers, chills, malaise, or weight loss.   Gastrointestinal: Denies constipation, diarrhea, or vomiting.   Genitourinary: Denies dysuria, or urinary frequency.Affirms vaginal bleeding.  Psych: Denies depression    Labs  TVUS  HCG    Physical Exam:  /63 (BP Location: Right arm, Patient Position: Sitting)   Pulse 73   Ht 5' 7" (1.702 m)   Wt 63 kg (138 lb 14.2 oz)   LMP 2024 (Exact Date)   BMI 21.75 kg/m² "   General: appears stated age, well developed, well nourished, and in no acute distress  Skin: warm and dry w/o rash or path lesions  Head: normocephalic, atraumatic  Neck: supple, no masses or thyromegaly  Lung: CTAB, normal resp effort  Heart: RRR   Extremities: FROM, no edema or tenderness  Musculoskeletal: normal, gait steady  Neurologic: grossly intact, non-focal  Lymph: no cervical, axillary or inguinal adenopathy appreciated    Assessment:  39 y.o.    Vaginal bleeding  Positive pregnancy test  Awaiting US reading by [facility] physician  Rh pos      Plan:  Miscarriage counseling:  - Discussed that this is a likely miscarriage, although definitive diagnosis cannot be made without ultrasound report and hcg results.  - Condolences provided today. Reviewed about 20% of pregnancies result in miscarriage.  - Time was spent discussing treatment options. We discussed expectant vs. surgical management. We discussed the risks, expectations, and benefits of each treatment option. We discussed that with a diagnosis of missed , expectant management would be reasonable to allow 2-4 weeks for complete spontaneous . We also discussed that both medical and expectant management options are less successful with missed  vs incomplete . Risks of these 2 management approaches include incomplete , bleeding, and/or necessity for surgical intervention.   - We also discussed the risks of surgical intervention to include the risk of anesthesia as well as complications such as uterine perforation, intrauterine adhesions, cervical trauma, and infection, which might lead to subsequent infertility or ectopic pregnancy. We also discussed benefits to include immediate evacuation of the uterus in the setting of intrauterine infection or heavy uterine bleeding as well as decreased risks of further postoperative bleeding.   - We also discussed etiology of spontaneous   - We discussed that this is  most commonly caused by chromosomal abnormalities in the embryo or exposure to teratogens. It is often difficult to determine the cause of a spontaneous  in an individual case.   - Chromosomal abnormalities account for approximately 50 percent of all miscarriages. Most such abnormalities are aneuploidies. Structural abnormalities and mosaicism are responsible for a small proportion. Abnormalities included: aneuploidy (85 percent); triploidy (10 percent); and tetraploidy (4.2 percent) . Most such abnormalities are aneuploidies that occur with increasing frequency as maternal age increases.  - The earlier  occurs, the higher the incidence of cytogenetic defects  - The prevalence of abnormal fetal karyotypes may be up to 90% in empty sac pregnancies, 50% at 8-11 weeks of gestation, and 30% at 16-19 weeks  - The patient has elected for expectant management  - Bleeding and pain precautions were discussed. Emergency contact information reviewed    Post AB Care  Call for: bleeding, infection, no menses x 6 wks, (r/o trophoblastic, new pregnancy, ashermans syndrome)  Pelvic rest x 2 wks  Defer preg x 2-3 months  Menses returns within 6 wks  Grief counseling  Serum bHCG - usually normal by 2-4wks after complete AB -- patient to come and have another hcg drawn next week    Updated Medication List:  Current Outpatient Medications   Medication Sig Dispense Refill    PNV,calcium 72-iron-folic acid (PRENATAL VITAMIN PLUS LOW IRON) 27 mg iron- 1 mg Tab Take 1 tablet (1 each total) by mouth once daily. (Patient not taking: Reported on 2025) 90 tablet 3    traZODone (DESYREL) 50 MG tablet Take 1 tablet (50 mg total) by mouth every evening. (Patient not taking: Reported on 2025) 30 tablet 2     No current facility-administered medications for this visit.           Denise Oviedo

## 2025-01-16 ENCOUNTER — PATIENT MESSAGE (OUTPATIENT)
Dept: OBSTETRICS AND GYNECOLOGY | Facility: CLINIC | Age: 40
End: 2025-01-16
Payer: COMMERCIAL

## 2025-01-27 ENCOUNTER — PATIENT MESSAGE (OUTPATIENT)
Dept: OBSTETRICS AND GYNECOLOGY | Facility: HOSPITAL | Age: 40
End: 2025-01-27
Payer: COMMERCIAL

## 2025-01-31 ENCOUNTER — LAB VISIT (OUTPATIENT)
Dept: LAB | Facility: OTHER | Age: 40
End: 2025-01-31
Payer: COMMERCIAL

## 2025-01-31 DIAGNOSIS — Z32.01 POSITIVE URINE PREGNANCY TEST: ICD-10-CM

## 2025-01-31 DIAGNOSIS — N93.9 VAGINAL BLEEDING: ICD-10-CM

## 2025-01-31 LAB — HCG INTACT+B SERPL-ACNC: 11 MIU/ML

## 2025-01-31 PROCEDURE — 84702 CHORIONIC GONADOTROPIN TEST: CPT

## 2025-01-31 PROCEDURE — 36415 COLL VENOUS BLD VENIPUNCTURE: CPT

## 2025-02-14 ENCOUNTER — LAB VISIT (OUTPATIENT)
Dept: LAB | Facility: OTHER | Age: 40
End: 2025-02-14
Payer: COMMERCIAL

## 2025-02-14 DIAGNOSIS — Z32.01 POSITIVE URINE PREGNANCY TEST: ICD-10-CM

## 2025-02-14 DIAGNOSIS — N93.9 VAGINAL BLEEDING: ICD-10-CM

## 2025-02-14 LAB — HCG INTACT+B SERPL-ACNC: 3.3 MIU/ML

## 2025-02-14 PROCEDURE — 36415 COLL VENOUS BLD VENIPUNCTURE: CPT

## 2025-02-14 PROCEDURE — 84702 CHORIONIC GONADOTROPIN TEST: CPT

## 2025-02-16 ENCOUNTER — RESULTS FOLLOW-UP (OUTPATIENT)
Dept: OBSTETRICS AND GYNECOLOGY | Facility: HOSPITAL | Age: 40
End: 2025-02-16

## 2025-02-18 DIAGNOSIS — F41.9 ANXIETY: Primary | ICD-10-CM

## 2025-03-21 ENCOUNTER — LAB VISIT (OUTPATIENT)
Dept: LAB | Facility: OTHER | Age: 40
End: 2025-03-21
Payer: COMMERCIAL

## 2025-03-21 ENCOUNTER — OFFICE VISIT (OUTPATIENT)
Dept: OBSTETRICS AND GYNECOLOGY | Facility: CLINIC | Age: 40
End: 2025-03-21
Payer: COMMERCIAL

## 2025-03-21 VITALS
DIASTOLIC BLOOD PRESSURE: 80 MMHG | HEART RATE: 65 BPM | BODY MASS INDEX: 22.25 KG/M2 | WEIGHT: 141.75 LBS | SYSTOLIC BLOOD PRESSURE: 119 MMHG | HEIGHT: 67 IN

## 2025-03-21 DIAGNOSIS — Z01.419 WELL WOMAN EXAM WITH ROUTINE GYNECOLOGICAL EXAM: Primary | ICD-10-CM

## 2025-03-21 DIAGNOSIS — Z01.419 WELL WOMAN EXAM WITH ROUTINE GYNECOLOGICAL EXAM: ICD-10-CM

## 2025-03-21 DIAGNOSIS — Z11.3 ROUTINE SCREENING FOR STI (SEXUALLY TRANSMITTED INFECTION): ICD-10-CM

## 2025-03-21 LAB
HAV IGM SERPL QL IA: NORMAL
HBV CORE IGM SERPL QL IA: NORMAL
HBV SURFACE AG SERPL QL IA: NORMAL
HCV AB SERPL QL IA: NEGATIVE
HIV 1+2 AB+HIV1 P24 AG SERPL QL IA: NEGATIVE
TREPONEMA PALLIDUM IGG+IGM AB [PRESENCE] IN SERUM OR PLASMA BY IMMUNOASSAY: NONREACTIVE

## 2025-03-21 PROCEDURE — 87591 N.GONORRHOEAE DNA AMP PROB: CPT

## 2025-03-21 PROCEDURE — 36415 COLL VENOUS BLD VENIPUNCTURE: CPT

## 2025-03-21 PROCEDURE — 87389 HIV-1 AG W/HIV-1&-2 AB AG IA: CPT

## 2025-03-21 PROCEDURE — 80074 ACUTE HEPATITIS PANEL: CPT

## 2025-03-21 PROCEDURE — 86593 SYPHILIS TEST NON-TREP QUANT: CPT

## 2025-03-21 PROCEDURE — 99999 PR PBB SHADOW E&M-EST. PATIENT-LVL III: CPT | Mod: PBBFAC,,,

## 2025-03-21 NOTE — PROGRESS NOTES
Marianela Contreras is a 39 y.o.  who presents today for her annual GYN exam.    C/C: annual GYN well woman preventative exam    HPI: She is currently sexually active and uses nothing for contraception. Denies dyspareunia.   Has no GYN related concerns. Desires STD testing. Reports no long term chronic medical conditions. Unsure if she desires pregnancy right now, but is not actively trying to prevent it.     MENSTRUAL HISTORY  Patient's last menstrual period was 2025 (exact date). She reports regular menses occurring every 28-32 days.  Menses last 4-5 days with normal flow. She denies dysmenorrhea.  She denies intermenstrual bleeding.    PAP HISTORY: last pap 2024, result NILM with neg HPV, denies any history of abnormal pap smear or STDs.     IMMUNIZATIONS: Has not had Gardisil, declines    SOCIAL HISTORY: Denies emotional/mental/physical/sexual violence or abuse. Feels safe at home.    Review of patient's allergies indicates:  No Known Allergies  Past Medical History:   Diagnosis Date    Anemia affecting pregnancy in third trimester 2022    3/31/22 - Discussed 28w labs and recommend iron supplement.  [x]Repeat CBC at 36w -- RESOLVED    Chlamydia     Trauma 24     History reviewed. No pertinent surgical history.  History reviewed. No pertinent surgical history.  OB History    Para Term  AB Living   3 1 1  2 1   SAB IAB Ectopic Multiple Live Births   2   0 1      # Outcome Date GA Lbr Samm/2nd Weight Sex Type Anes PTL Lv   3 SAB 25     SAB      2 SAB 22     SAB      1 Term 22 41w3d  3.57 kg (7 lb 13.9 oz) F Vag-Spont Local, OTHER N NADEGE     OB History          3    Para   1    Term   1            AB   2    Living   1         SAB   2    IAB        Ectopic        Multiple   0    Live Births   1               Social History[1]  Family History   Problem Relation Name Age of Onset    Breast cancer Mother Svitlana Reyes         Dx around age 40     "Colon cancer Neg Hx      Ovarian cancer Neg Hx      Heart attack Neg Hx      Stroke Neg Hx      Cervical cancer Neg Hx      Uterine cancer Neg Hx       Social History     Substance and Sexual Activity   Sexual Activity Not Currently    Partners: Male    Birth control/protection: None    Comment: Florence Escalona MD ROS  Constitutional/Gen: Negative--fever, weight change, fatigue   Skin:  Negative--Hirsutism, acne, rash  CV/vasc: Negative--chest pain, palpitations, syncope  Resp:  Negative--Cough, shortness of breath, wheezing  GI:  Negative--Nausea, vomiting, diarrhea, constipation, abdominal pain  :  Negative--Dysuria, vaginal discharge, genital sores, dyspareunia   Lymph:  Negative--Swollen glands, frequent infection  Heme:  Negative--Easy bruising or bleeding, clot  Breast: Negative--Mass, lump, nipple discharge, tenderness. Tissue marker clip present.  MS:  Negative--Back/joint pain, muscle weakness, difficulty walking  Neuro: Negative--Numbness, tingling, confusion, headaches, seizures, dizziness  Psych: Negative--Depressed mood, anxiety, insomnia  Endocrine:  Negative--Polydipsia, polyuria, heat or cold intolerance    OBJECTIVE:  /80 (BP Location: Right arm, Patient Position: Sitting)   Pulse 65   Ht 5' 7.32" (1.71 m)   Wt 64.3 kg (141 lb 12.1 oz)   LMP 02/27/2025 (Exact Date)   BMI 21.99 kg/m²   Gen:  NAD, appears stated age, well groomed  Skin: warm and dry w/o rash  Head: normocephalic  Mouth: good dental hygiene  Neck: supple, no masses or enlargement  Lung: normal resp effort  Heart: normal HR  Breast: bilaterally--no masses, skin changes, or nipple discharge noted. Describes some tenderness but states that it's likely it's right before her cycle.  Abdomen: soft, nontender, no masses, and bowel sounds normal  External genitalia: no lesions or discharge, normal hair distribution  Urethral meatus: normal size and location, no lesions or prolapse  Vagina: normal appearance, " no lesions, no discharge, no evidence cystocele or rectocele.  Cervix: normal appearance, no discharge, no lesions, negative CMT  Uterus: nontender, mobile, normal size, contour, and position.   Adnexa: no masses or tenderness  Anus: normal appearance, with no lesions or discharge. Internal exam deferred.  Extremities: FROM, with no edema or tenderness.  Neurologic: A&O x 4, non-focal, cranial nerves 2-12 grossly intact  Psych:  appropriate affect and no signs of mood, thought or memory difficulty appreciated      ASSESSMENT/PLAN:   39 y.o. female  for GYN annual well woman exam  -- Discussed ASCCP pap guidelines. Last pap 2024 result NILM with neg HPV; next pap due no later than   -- Desires STD testing -- GC/CT, HIV, Trep, Hepatitis panel  -- Discussed continuing to take a PNV if there is a chance that she could become pregnant  --Mammogram - had in December along with biopsy. Follow up recommended in six months.  --Continue annual exams, return in 1 year or sooner prn    Contraceptive counseling, status, surveillance  -- Declines at this time    Updated Medication List:  Current Medications[2]     Denise Oviedo CNM          [1]   Social History  Socioeconomic History    Marital status: Single   Tobacco Use    Smoking status: Never    Smokeless tobacco: Never   Substance and Sexual Activity    Alcohol use: Not Currently    Drug use: Never    Sexual activity: Not Currently     Partners: Male     Birth control/protection: None     Comment: Isaiah   Social History Narrative    She is a  at [employer].    [2]   Current Outpatient Medications   Medication Sig Dispense Refill    PNV,calcium 72-iron-folic acid (PRENATAL VITAMIN PLUS LOW IRON) 27 mg iron- 1 mg Tab Take 1 tablet (1 each total) by mouth once daily. 90 tablet 3    traZODone (DESYREL) 50 MG tablet Take 1 tablet (50 mg total) by mouth every evening. (Patient not taking: Reported on 2025) 30  tablet 2     No current facility-administered medications for this visit.

## 2025-03-22 ENCOUNTER — RESULTS FOLLOW-UP (OUTPATIENT)
Dept: OBSTETRICS AND GYNECOLOGY | Facility: HOSPITAL | Age: 40
End: 2025-03-22

## 2025-03-22 LAB
C TRACH DNA SPEC QL NAA+PROBE: NOT DETECTED
N GONORRHOEA DNA SPEC QL NAA+PROBE: NOT DETECTED

## 2025-05-13 ENCOUNTER — TELEPHONE (OUTPATIENT)
Dept: RADIOLOGY | Facility: OTHER | Age: 40
End: 2025-05-13
Payer: COMMERCIAL

## 2025-05-13 NOTE — TELEPHONE ENCOUNTER
Patient does not want to have mammogram follow up. States she understands that we are concerned but she does not want the exam at this time. Notified pt she will receive a reminder for the recommendation, verbalized understanding.